# Patient Record
Sex: MALE | Race: WHITE
[De-identification: names, ages, dates, MRNs, and addresses within clinical notes are randomized per-mention and may not be internally consistent; named-entity substitution may affect disease eponyms.]

---

## 2019-12-24 ENCOUNTER — HOSPITAL ENCOUNTER (INPATIENT)
Dept: HOSPITAL 74 - YASAS | Age: 39
LOS: 3 days | Discharge: HOME | DRG: 897 | End: 2019-12-27
Attending: ALLERGY & IMMUNOLOGY | Admitting: ALLERGY & IMMUNOLOGY
Payer: COMMERCIAL

## 2019-12-24 VITALS — BODY MASS INDEX: 42.8 KG/M2

## 2019-12-24 DIAGNOSIS — F10.230: Primary | ICD-10-CM

## 2019-12-24 DIAGNOSIS — F17.210: ICD-10-CM

## 2019-12-24 DIAGNOSIS — K70.30: ICD-10-CM

## 2019-12-24 DIAGNOSIS — E87.6: ICD-10-CM

## 2019-12-24 DIAGNOSIS — Z91.14: ICD-10-CM

## 2019-12-24 DIAGNOSIS — F14.20: ICD-10-CM

## 2019-12-24 PROCEDURE — HZ2ZZZZ DETOXIFICATION SERVICES FOR SUBSTANCE ABUSE TREATMENT: ICD-10-PCS | Performed by: ALLERGY & IMMUNOLOGY

## 2019-12-24 RX ADMIN — FAMOTIDINE SCH MG: 20 TABLET ORAL at 21:52

## 2019-12-24 RX ADMIN — Medication SCH: at 23:29

## 2019-12-24 NOTE — HP
CIWA Score


Nausea/Vomitin


Muscle Tremors: 3


Anxiety: 3


Agitation: 3


Paroxysmal Sweats: 1-Minimal Palms Moist


Orientation: 0-Oriented


Tacttile Disturbances: 1-Very Mild Itch/Numbness


Auditory Disturbances: 0-None


Visual Disturbances: 0-None


Headache: 2-Mild


CIWA-Ar Total Score: 15





- Admission Criteria


[facility] Guidelines: Admission for Medically Managed Detox: 


Requires at least one of the followin. CIWA greater than 12


2. Seizures within the past 24 hours


3. Delirium tremens within the past 24 hours


4. Hallucinations within the past 24 hours


5. Acute intervention needed for co  occurring medical disorder


6. Acute intervention needed for co  occurring psychiatric disorder


7. Severe withdrawal that cannot be handled at a lower level of care (continued


    vomiting, continued diarrhea, abnormal vital signs) requiring intravenous


    medication and/or fluids


8. Pregnancy








Admitting History and Physical





- Admission


Chief Complaint: i need help to stop drinking alcohol,crack


History of Present Illness: 





this 39 years old mlae with alcohol and crack dependence,seeking help to stop,

denied seizure,


syncope


multiple admissions ,last detox 1 week ago


hepatitis c no treatment


history of cirrhosis in 


seen at [facility] by ambulance 19


longest sobriety 2 years


bipolar 1 disorder








History Source: Patient


Limitations to Obtaining History: No Limitations





- Past Medical History


Hepatobiliary: Yes: Cirrhosis


Psych: Yes: Bipolar


Endocrine: Yes: Hyperthyroidism





- Past Surgical History


Additional Past Surgical History: 





carotid stenosis


undescend testes








- Smoking History


Smoking history: Former smoker


Have you smoked in the past 12 months: Yes


Aproximately how many cigarettes per day: 10





- Alcohol/Substance Use


Hx Alcohol Use: Yes ([facility])





- Social History


Usual Living Arrangement: Yes: Other (room mate)


ADL: Support Services (disable)


Occupation: unemploye


History of Recent Travel: No


Other Social History: this 39 years old male with alcohol and cocaine dependence

,seeking detox,withdrawal symptom,seen by [facility] last night,.  disable





Admission ROS [facility]





- HPI


Chief Complaint: 





i need help to stop alcohol and cocaine


Allergies/Adverse Reactions: 


 Allergies











Allergy/AdvReac Type Severity Reaction Status Date / Time


 


amoxicillin trihydrate Allergy Severe Swelling Verified 19 15:08





[From Augmentin]     


 


potassium clavulanate Allergy Severe Hives Verified 19 15:08





[From Augmentin]     


 


Sulfa (Sulfonamide Allergy Severe Swelling Verified 19 15:08





Antibiotics)     











History of Present Illness: 





this 39 years old male with alcohol and cocaine dependence,seeking detox,

withdrawal symptom,seen in [facility] today,recieving ativan


denied seizure


denied syncope


nicotine dependence


bipolar 1 disorder


cirrhosis of liver since 








Exam Limitations: No Limitations





- Ebola screening


Have you traveled outside of the country in the last 21 days: No


Have you had contact with anyone from an Ebola affected area: No


Do you have a fever: No





- Review of Systems


Constitutional: Loss of Appetite, Malaise, Night Sweats, Changes in sleep, 

Weakness


EENT: reports: Nose Congestion


Respiratory: reports: No Symptoms reported


Cardiac: reports: No Symptoms Reported


GI: reports: Nausea, Poor Appetite, Vomiting, Abdominal cramping


: reports: No Symptoms Reported


Musculoskeletal: reports: Back Pain, Muscle Pain


Integumentary: reports: Dryness


Neuro: reports: Headache, Tremors


Endocrine: reports: No Symptoms Reported


Hematology: reports: No Symptoms Reported


Psychiatric: reports: No Sypmtoms Reported, Judgement Intact, Mood/Affect 

Appropiate, Orientated x3


Other Systems: Reviewed and Negative





Patient History





- Patient Medical History


Hx Anemia: No


Hx Asthma: No


Hx Chronic Obstructive Pulmonary Disease (COPD): No


Hx Cancer: No


Hx Cardiac Disorders: No


Hx Congestive Heart Failure: No


Hx Hypertension: Yes (on med,non compliance)


Hx Hypercholesterolemia: No


Hx Pacemaker: No


HX Cerebrovascular Accident: No


Hx Seizures: Yes


Hx Dementia: No


Hx Diabetes: No


Hx Gastrointestinal Disorders: No


Hx Liver Disease: Yes (STAGE 4 CIRROHSIS)


Hx Genitourinary Disorders: No


Hx Sexually Transmitted Disorders: No


Hx Renal Disease (ESRD): No


Hx Thyroid Disease: No


Hx Human Immunodeficiency Virus (HIV): No (last 2019 negative)


Hx Hepatitis C: Yes


Hx Depression: Yes


Hx Suicide Attempt: No


Hx Bipolar Disorder: Yes (borderline personality disorder)


Hx Schizophrenia: No


Other Medical History: no suicidal,no homicidal





- Patient Surgical History


Past Surgical History: Yes


Hx Neurologic Surgery: No


Hx Cataract Extraction: No


Hx Cardiac Surgery: No


Hx Lung Surgery: No


Hx Breast Surgery: No


Hx Breast Biopsy: No


Hx Abdominal Surgery: No


Hx Appendectomy: No


Hx Cholecystectomy: No


Hx Genitourinary Surgery: No


Hx  Section: No


Hx Orthopedic Surgery: No


Hx Hysterectomy: No


Other Surgical History: as infant craniosytosis/ undescended testicle/ deviated 

septum


Anesthesia Reaction: No





- PPD History


Previous Implant?: Yes


Implanted On Prior [facility] Admission?: Yes


Date: 11/12/15


Results: 0 mm


PPD to be Administered?: Yes





- Smoking Cessation


Smoking history: Former smoker


Have you smoked in the past 12 months: Yes


Aproximately how many cigarettes per day: 10


Hx Chewing Tobacco Use: No


Initiated information on smoking cessation: Yes


'Breaking Loose' booklet given: 19





- Substance & Tx. History


Hx Alcohol Use: Yes


Hx Substance Use: Yes


Substance Use Type: Alcohol, Cocaine


Hx Substance Use Treatment: Yes (1 week ago,unknown facility)





- Substances abused


  ** Alcohol


Substance route: Oral


Frequency: Daily


Amount used: >LITER OF BOURBON/VODKA AND 4 24-oz cans of 4locos


Age of first use: 13


Date of last use: 19





  ** Crack


Substance route: Smoking


Frequency: Daily


Amount used: $60


Age of first use: 16


Date of last use: 19





  ** Non-Rx Methadone


Substance route: Oral


Frequency: 1-3 times last 30 days


Amount used: 5mg


Age of first use: 39


Date of last use: 20





  ** K2/Spice


Substance route: Smoking


Frequency: 1-3 times last 30 days


Amount used: 3 puffs


Age of first use: 22


Date of last use: 19





Admission Physical Exam [facility]





- Vital Signs


Vital Signs: 


 Vital Signs - 24 hr











  19





  15:06


 


Temperature 98.2 F


 


Pulse Rate 96 H


 


Respiratory 18





Rate 


 


Blood Pressure 125/76














- Physical


General Appearance: Yes: Moderate Distress, Tremorous, Irritable, Sweating, 

Anxious


HEENTM: Yes: Normal ENT Inspection, ISABELLE, Pharynx Normal


Respiratory: Yes: Lungs Clear, Normal Breath Sounds, No Respiratory Distress


Neck: Yes: Within Normal Limits, Supple, Trachea in good position


Breast: Yes: Within Normal Limits


Cardiology: Yes: Within Normal Limits, Regular Rhythm, Regular Rate, S1, S2


Abdominal: Yes: Normal Bowel Sounds, Non Tender, Flat, Soft


Genitourinary: Yes: Within Normal Limits


Back: Yes: Muscle Spasm


Musculoskeletal: Yes: Back pain, Muscle Pain


Extremities: Yes: Tremors


Neurological: Yes: CNs II-XII NML intact, Alert, Motor Strength 5/5


Integumentary: Yes: Dry


Lymphatic: Yes: Within Normal Limits





- Diagnostic


(1) Alcohol dependence with uncomplicated withdrawal


Current Visit: No   Status: Acute   





(2) Cocaine abuse


Current Visit: No   Status: Acute   





(3) Syncope


Current Visit: No   Status: Acute   





(4) Hepatitis C


Current Visit: No   Status: Chronic   


Qualifiers: 


 





(5) Cocaine dependence


Current Visit: No   Status: Suspected   


Qualifiers: 


   Substance use status: uncomplicated   Qualified Code(s): F14.20 - Cocaine 

dependence, uncomplicated   





(6) Bipolar I disorder


Current Visit: No   Status: Chronic   





(7) Cirrhosis


Current Visit: No   Status: Chronic   


Qualifiers: 


   Hepatic cirrhosis type: alcoholic cirrhosis   Ascites presence: without 

ascites   Qualified Code(s): K70.30 - Alcoholic cirrhosis of liver without 

ascites   





Cleared for Admission S





- Detox or Rehab


[facility] Level of Care: Medically Managed


Detox Regimen/Protocol: Ativan





Breathalyzer





- Breathalyzer


Breathalyzer: 0





Urine Drug Screen





- Test Device


Lot number: [id number]


Expiration date: 21





- Control


Is test valid?: Yes





- Results


Drug screen NEGATIVE: No


Urine drug screen results: ROHIT-Cocaine, BZO-Benzodiazepines





Inpatient Rehab Admission





- Rehab Decision to Admit


Inpatient rehab admission?: No

## 2019-12-25 LAB
ALBUMIN SERPL-MCNC: 3.4 G/DL (ref 3.4–5)
ALP SERPL-CCNC: 104 U/L (ref 45–117)
ALT SERPL-CCNC: 36 U/L (ref 13–61)
ANION GAP SERPL CALC-SCNC: 6 MMOL/L (ref 8–16)
APPEARANCE UR: CLEAR
AST SERPL-CCNC: 42 U/L (ref 15–37)
BILIRUB SERPL-MCNC: 1.1 MG/DL (ref 0.2–1)
BILIRUB UR STRIP.AUTO-MCNC: NEGATIVE MG/DL
BUN SERPL-MCNC: 8.6 MG/DL (ref 7–18)
CALCIUM SERPL-MCNC: 8.4 MG/DL (ref 8.5–10.1)
CHLORIDE SERPL-SCNC: 107 MMOL/L (ref 98–107)
CO2 SERPL-SCNC: 28 MMOL/L (ref 21–32)
COLOR UR: YELLOW
CREAT SERPL-MCNC: 0.7 MG/DL (ref 0.55–1.3)
DEPRECATED RDW RBC AUTO: 16.5 % (ref 11.9–15.9)
GLUCOSE SERPL-MCNC: 94 MG/DL (ref 74–106)
HCT VFR BLD CALC: 38.4 % (ref 35.4–49)
HGB BLD-MCNC: 13.3 GM/DL (ref 11.7–16.9)
KETONES UR QL STRIP: NEGATIVE
LEUKOCYTE ESTERASE UR QL STRIP.AUTO: NEGATIVE
MCH RBC QN AUTO: 30.9 PG (ref 25.7–33.7)
MCHC RBC AUTO-ENTMCNC: 34.7 G/DL (ref 32–35.9)
MCV RBC: 89 FL (ref 80–96)
NITRITE UR QL STRIP: NEGATIVE
PH UR: 6 [PH] (ref 5–8)
PLATELET # BLD AUTO: 66 K/MM3 (ref 134–434)
PMV BLD: 10.3 FL (ref 7.5–11.1)
POTASSIUM SERPLBLD-SCNC: 3.4 MMOL/L (ref 3.5–5.1)
PROT SERPL-MCNC: 6 G/DL (ref 6.4–8.2)
PROT UR QL STRIP: NEGATIVE
PROT UR QL STRIP: NEGATIVE
RBC # BLD AUTO: 4.32 M/MM3 (ref 4–5.6)
SODIUM SERPL-SCNC: 141 MMOL/L (ref 136–145)
SP GR UR: 1.01 (ref 1.01–1.03)
UROBILINOGEN UR STRIP-MCNC: 1 MG/DL (ref 0.2–1)
WBC # BLD AUTO: 3.5 K/MM3 (ref 4–10)

## 2019-12-25 RX ADMIN — Medication SCH MG: at 22:17

## 2019-12-25 RX ADMIN — HYDROXYZINE PAMOATE PRN MG: 25 CAPSULE ORAL at 18:04

## 2019-12-25 RX ADMIN — FAMOTIDINE SCH MG: 20 TABLET ORAL at 10:00

## 2019-12-25 RX ADMIN — Medication SCH TAB: at 10:00

## 2019-12-25 RX ADMIN — Medication PRN MG: at 22:18

## 2019-12-25 NOTE — PN
S CIWA





- CIWA Score


Nausea/Vomitin-Mild Nausea/No Vomiting


Muscle Tremors: 3


Anxiety: 3


Agitation: 1-Slight > Activity


Paroxysmal Sweats: 2


Orientation: 1-Uncertain about Date (date of week)


Tacttile Disturbances: 0-None


Auditory Disturbances: 0-None


Visual Disturbances: 0-None


Headache: 1-Very Mild


CIWA-Ar Total Score: 12





[facility] Progress Note (SOAP)


Subjective: 





39 years old male admitted on 19 for alcohol withdrawal sx management


treating with ativan detox regimen


ate breakfast tolerated food and fluid well ambulating on hallway steady gait 


encourage to attend groups and meetings


Objective: 





19 09:40


 Vital Signs











Temperature  97.3 F L  19 09:20


 


Pulse Rate  80   19 09:20


 


Respiratory Rate  18   19 09:20


 


Blood Pressure  130/82   19 09:20


 


O2 Sat by Pulse Oximetry (%)      








lab pending


Assessment: 





19 09:41


alcohol withdrawal


Plan: 





ativan regimen

## 2019-12-26 RX ADMIN — Medication SCH TAB: at 10:21

## 2019-12-26 RX ADMIN — POTASSIUM CHLORIDE SCH MEQ: 20 SOLUTION ORAL at 22:10

## 2019-12-26 RX ADMIN — Medication PRN MG: at 22:12

## 2019-12-26 RX ADMIN — POTASSIUM CHLORIDE SCH MEQ: 20 SOLUTION ORAL at 12:10

## 2019-12-26 RX ADMIN — Medication SCH MG: at 22:11

## 2019-12-26 RX ADMIN — HYDROXYZINE PAMOATE PRN MG: 25 CAPSULE ORAL at 17:15

## 2019-12-26 RX ADMIN — FAMOTIDINE SCH MG: 20 TABLET ORAL at 10:21

## 2019-12-26 NOTE — PN
[facility] CIWA





- CIWA Score


Nausea/Vomitin-Mild Nausea/No Vomiting


Muscle Tremors: 3


Anxiety: 4-Mod. Anxious/Guarded


Agitation: 2


Paroxysmal Sweats: 1-Minimal Palms Moist


Orientation: 0-Oriented


Tacttile Disturbances: 0-None


Auditory Disturbances: 0-None


Visual Disturbances: 0-None


Headache: 0-None Present


CIWA-Ar Total Score: 11





[facility] Progress Note (SOAP)


Subjective: 





39 years old male admitted on 19 for alcohol withdrawal sx management


treating with ativan detox regimen


c/o loose stool x 1 after breakfast


imodium 4 mg po x 1


discontinue MOM and citric


Objective: 





19 10:31


 Vital Signs











Temperature  96.9 F L  19 09:26


 


Pulse Rate  69   19 09:26


 


Respiratory Rate  18   19 09:26


 


Blood Pressure  108/65   19 09:26


 


O2 Sat by Pulse Oximetry (%)      








 Laboratory Last Values











WBC  3.5 K/mm3 (4.0-10.0)  L  19  07:50    


 


RBC  4.32 M/mm3 (4.00-5.60)   19  07:50    


 


Hgb  13.3 GM/dL (11.7-16.9)   19  07:50    


 


Hct  38.4 % (35.4-49)   19  07:50    


 


MCV  89.0 fl (80-96)   19  07:50    


 


MCH  30.9 pg (25.7-33.7)   19  07:50    


 


MCHC  34.7 g/dl (32.0-35.9)   19  07:50    


 


RDW  16.5 % (11.9-15.9)  H  19  07:50    


 


Plt Count  66 K/MM3 (134-434)  L D 19  07:50    


 


MPV  10.3 fl (7.5-11.1)   19  07:50    


 


Sodium  141 mmol/L (136-145)   19  07:50    


 


Potassium  3.4 mmol/L (3.5-5.1)  L  19  07:50    


 


Chloride  107 mmol/L ()   19  07:50    


 


Carbon Dioxide  28 mmol/L (21-32)   19  07:50    


 


Anion Gap  6 MMOL/L (8-16)  L  19  07:50    


 


BUN  8.6 mg/dL (7-18)   19  07:50    


 


Creatinine  0.7 mg/dL (0.55-1.3)   19  07:50    


 


Est GFR (CKD-EPI)AfAm  138.75   19  07:50    


 


Est GFR (CKD-EPI)NonAf  119.71   19  07:50    


 


Random Glucose  94 mg/dL ()   19  07:50    


 


Calcium  8.4 mg/dL (8.5-10.1)  L  19  07:50    


 


Total Bilirubin  1.1 mg/dL (0.2-1)  H  19  07:50    


 


AST  42 U/L (15-37)  H  19  07:50    


 


ALT  36 U/L (13-61)   19  07:50    


 


Alkaline Phosphatase  104 U/L ()   19  07:50    


 


Total Protein  6.0 g/dl (6.4-8.2)  L  19  07:50    


 


Albumin  3.4 g/dl (3.4-5.0)   19  07:50    


 


Urine Color  Yellow   19  07:50    


 


Urine Appearance  Clear   19  07:50    


 


Urine pH  6.0  (5.0-8.0)  D 19  07:50    


 


Ur Specific Gravity  1.014  (1.010-1.035)   19  07:50    


 


Urine Protein  Negative  (NEGATIVE)   19  07:50    


 


Urine Glucose (UA)  Negative  (NEGATIVE)   19  07:50    


 


Urine Ketones  Negative  (NEGATIVE)   19  07:50    


 


Urine Blood  Negative  (NEGATIVE)   19  07:50    


 


Urine Nitrite  Negative  (NEGATIVE)   19  07:50    


 


Urine Bilirubin  Negative  (NEGATIVE)   19  07:50    


 


Urine Urobilinogen  1.0 mg/dL (0.2-1.0)   19  07:50    


 


Ur Leukocyte Esterase  Negative  (NEGATIVE)   19  07:50    


 


RPR Titer  Nonreactive  (NONREACTIVE)   19  07:50    








lab noted


low plt


discontinue motrin


low K+


potassium 20meq bid x 2 days


19 10:33





Assessment: 





19 10:34


alcohol withdrawal


Plan: 





ativan regimen

## 2019-12-26 NOTE — EKG
Test Reason : 

Blood Pressure : ***/*** mmHG

Vent. Rate : 088 BPM     Atrial Rate : 088 BPM

   P-R Int : 150 ms          QRS Dur : 098 ms

    QT Int : 386 ms       P-R-T Axes : 051 003 042 degrees

   QTc Int : 467 ms

 

NORMAL SINUS RHYTHM

NORMAL ECG

WHEN COMPARED WITH ECG OF 31-AUG-2016 21:53,

NO SIGNIFICANT CHANGE WAS FOUND

Confirmed by CHRIS WREN MD ([id number]) on 12/26/2019 10:31:06 AM

 

Referred By:  MANUEL           Confirmed By:CHRIS WREN MD

## 2019-12-27 VITALS — DIASTOLIC BLOOD PRESSURE: 61 MMHG | TEMPERATURE: 97.1 F | HEART RATE: 69 BPM | SYSTOLIC BLOOD PRESSURE: 104 MMHG

## 2019-12-27 RX ADMIN — HYDROXYZINE PAMOATE PRN MG: 25 CAPSULE ORAL at 01:40

## 2019-12-27 RX ADMIN — POTASSIUM CHLORIDE SCH MEQ: 20 SOLUTION ORAL at 10:16

## 2019-12-27 RX ADMIN — FAMOTIDINE SCH MG: 20 TABLET ORAL at 10:14

## 2019-12-27 RX ADMIN — Medication SCH TAB: at 10:14

## 2019-12-27 NOTE — PN
S CIWA





- CIWA Score


Nausea/Vomitin-No Nausea/No Vomiting


Muscle Tremors: None


Anxiety: 2


Agitation: 0-Normal Activity


Paroxysmal Sweats: 2


Orientation: 0-Oriented


Tacttile Disturbances: 0-None


Auditory Disturbances: 0-None


Visual Disturbances: 0-None


Headache: 2-Mild


CIWA-Ar Total Score: 6





[facility] Progress Note (SOAP)


Subjective: 





c/o mild sweats and anxiety.


Objective: 





19 11:55


 Vital Signs











  19





  06:16 09:09


 


Temperature 97.4 F L 97.8 F


 


Pulse Rate 68 71


 


Respiratory 20 16





Rate  


 


Blood Pressure 112/63 119/73








 Laboratory Last Values











WBC  3.5 K/mm3 (4.0-10.0)  L  19  07:50    


 


RBC  4.32 M/mm3 (4.00-5.60)   19  07:50    


 


Hgb  13.3 GM/dL (11.7-16.9)   19  07:50    


 


Hct  38.4 % (35.4-49)   19  07:50    


 


MCV  89.0 fl (80-96)   19  07:50    


 


MCH  30.9 pg (25.7-33.7)   19  07:50    


 


MCHC  34.7 g/dl (32.0-35.9)   19  07:50    


 


RDW  16.5 % (11.9-15.9)  H  19  07:50    


 


Plt Count  66 K/MM3 (134-434)  L D 19  07:50    


 


MPV  10.3 fl (7.5-11.1)   19  07:50    


 


Sodium  141 mmol/L (136-145)   19  07:50    


 


Potassium  3.4 mmol/L (3.5-5.1)  L  19  07:50    


 


Chloride  107 mmol/L ()   19  07:50    


 


Carbon Dioxide  28 mmol/L (21-32)   19  07:50    


 


Anion Gap  6 MMOL/L (8-16)  L  19  07:50    


 


BUN  8.6 mg/dL (7-18)   19  07:50    


 


Creatinine  0.7 mg/dL (0.55-1.3)   19  07:50    


 


Est GFR (CKD-EPI)AfAm  138.75   19  07:50    


 


Est GFR (CKD-EPI)NonAf  119.71   19  07:50    


 


Random Glucose  94 mg/dL ()   19  07:50    


 


Calcium  8.4 mg/dL (8.5-10.1)  L  19  07:50    


 


Total Bilirubin  1.1 mg/dL (0.2-1)  H  19  07:50    


 


AST  42 U/L (15-37)  H  19  07:50    


 


ALT  36 U/L (13-61)   19  07:50    


 


Alkaline Phosphatase  104 U/L ()   19  07:50    


 


Total Protein  6.0 g/dl (6.4-8.2)  L  19  07:50    


 


Albumin  3.4 g/dl (3.4-5.0)   19  07:50    


 


Urine Color  Yellow   19  07:50    


 


Urine Appearance  Clear   19  07:50    


 


Urine pH  6.0  (5.0-8.0)  D 19  07:50    


 


Ur Specific Gravity  1.014  (1.010-1.035)   19  07:50    


 


Urine Protein  Negative  (NEGATIVE)   19  07:50    


 


Urine Glucose (UA)  Negative  (NEGATIVE)   19  07:50    


 


Urine Ketones  Negative  (NEGATIVE)   19  07:50    


 


Urine Blood  Negative  (NEGATIVE)   19  07:50    


 


Urine Nitrite  Negative  (NEGATIVE)   19  07:50    


 


Urine Bilirubin  Negative  (NEGATIVE)   19  07:50    


 


Urine Urobilinogen  1.0 mg/dL (0.2-1.0)   19  07:50    


 


Ur Leukocyte Esterase  Negative  (NEGATIVE)   19  07:50    


 


RPR Titer  Nonreactive  (NONREACTIVE)   19  07:50    








Labs noted.


Assessment: 





19 11:56


AOX3, in no acute respiratory distress.


Full ROM, ambulating in the unit.


Withdrawal symptoms.


For d/c tomorrow.


Plan: 





continue detox.


D/C in AM.

## 2019-12-27 NOTE — DS
[facility] Detox Discharge Summary


Admission Date: 


12/24/19





Discharge Date: 12/27/19





- History


Present History: Alcohol Dependence, Cocaine Dependence


Additional Comments: 





Admitted seeking alcohol detox.





- Physical Exam Results


Vital Signs: 


 Vital Signs











Temperature  97.1 F L  12/27/19 17:08


 


Pulse Rate  69   12/27/19 17:08


 


Respiratory Rate  18   12/27/19 17:08


 


Blood Pressure  104/61   12/27/19 17:08


 


O2 Sat by Pulse Oximetry (%)      











Pertinent Admission Physical Exam Findings: 





Admitted w/ alcohol withdrawal symptoms.


 Laboratory Last Values











WBC  3.5 K/mm3 (4.0-10.0)  L  12/25/19  07:50    


 


RBC  4.32 M/mm3 (4.00-5.60)   12/25/19  07:50    


 


Hgb  13.3 GM/dL (11.7-16.9)   12/25/19  07:50    


 


Hct  38.4 % (35.4-49)   12/25/19  07:50    


 


MCV  89.0 fl (80-96)   12/25/19  07:50    


 


MCH  30.9 pg (25.7-33.7)   12/25/19  07:50    


 


MCHC  34.7 g/dl (32.0-35.9)   12/25/19  07:50    


 


RDW  16.5 % (11.9-15.9)  H  12/25/19  07:50    


 


Plt Count  66 K/MM3 (134-434)  L D 12/25/19  07:50    


 


MPV  10.3 fl (7.5-11.1)   12/25/19  07:50    


 


Sodium  141 mmol/L (136-145)   12/25/19  07:50    


 


Potassium  3.4 mmol/L (3.5-5.1)  L  12/25/19  07:50    


 


Chloride  107 mmol/L ()   12/25/19  07:50    


 


Carbon Dioxide  28 mmol/L (21-32)   12/25/19  07:50    


 


Anion Gap  6 MMOL/L (8-16)  L  12/25/19  07:50    


 


BUN  8.6 mg/dL (7-18)   12/25/19  07:50    


 


Creatinine  0.7 mg/dL (0.55-1.3)   12/25/19  07:50    


 


Est GFR (CKD-EPI)AfAm  138.75   12/25/19  07:50    


 


Est GFR (CKD-EPI)NonAf  119.71   12/25/19  07:50    


 


Random Glucose  94 mg/dL ()   12/25/19  07:50    


 


Calcium  8.4 mg/dL (8.5-10.1)  L  12/25/19  07:50    


 


Total Bilirubin  1.1 mg/dL (0.2-1)  H  12/25/19  07:50    


 


AST  42 U/L (15-37)  H  12/25/19  07:50    


 


ALT  36 U/L (13-61)   12/25/19  07:50    


 


Alkaline Phosphatase  104 U/L ()   12/25/19  07:50    


 


Total Protein  6.0 g/dl (6.4-8.2)  L  12/25/19  07:50    


 


Albumin  3.4 g/dl (3.4-5.0)   12/25/19  07:50    


 


Urine Color  Yellow   12/25/19  07:50    


 


Urine Appearance  Clear   12/25/19  07:50    


 


Urine pH  6.0  (5.0-8.0)  D 12/25/19  07:50    


 


Ur Specific Gravity  1.014  (1.010-1.035)   12/25/19  07:50    


 


Urine Protein  Negative  (NEGATIVE)   12/25/19  07:50    


 


Urine Glucose (UA)  Negative  (NEGATIVE)   12/25/19  07:50    


 


Urine Ketones  Negative  (NEGATIVE)   12/25/19  07:50    


 


Urine Blood  Negative  (NEGATIVE)   12/25/19  07:50    


 


Urine Nitrite  Negative  (NEGATIVE)   12/25/19  07:50    


 


Urine Bilirubin  Negative  (NEGATIVE)   12/25/19  07:50    


 


Urine Urobilinogen  1.0 mg/dL (0.2-1.0)   12/25/19  07:50    


 


Ur Leukocyte Esterase  Negative  (NEGATIVE)   12/25/19  07:50    


 


RPR Titer  Nonreactive  (NONREACTIVE)   12/25/19  07:50    








Labs reviewed.


Patient encouraged to eat a banana a day and drink orange juice. Patient 

encouraged to f/u w/ PCP and show lab reports from this admission.


Patient stated no discharge meds needed. 


Encouraged to f/u w/ a MH Provider or go to ER or call 911 if having suicide or 

violent ideation or feeling very ill. 








- Treatment


Hospital Course: Detox Protocol Followed, Discharged Condition Good (Alert and 

oriented. Denies thoughts of harming self or others. Discussed real potential 

for relapse and stated "I'm fine".)





- Medication


Discharge Medications: 


Ambulatory Orders





Mirtazapine 30 mg PO HS 08/28/16 


Omeprazole 40 mg PO DAILY 08/28/16 


Propranolol HCl 20 mg PO BID 08/28/16 


Bupropion HCl [Wellbutrin Xl] 300 mg PO DAILY 12/24/19 











- Diagnosis


(1) History of syncope


Status: Suspected   





(2) Alcohol dependence with uncomplicated withdrawal


Status: Acute   





(3) Nicotine dependence


Status: Chronic   


Qualifiers: 


   Nicotine product type: cigarettes   Substance use status: uncomplicated   

Qualified Code(s): F17.210 - Nicotine dependence, cigarettes, uncomplicated   





(4) Cocaine dependence


Status: Chronic   


Qualifiers: 


   Substance use status: uncomplicated   Qualified Code(s): F14.20 - Cocaine 

dependence, uncomplicated   





- AMA


Did Patient Leave Against Medical Advice: No

## 2021-03-06 ENCOUNTER — HOSPITAL ENCOUNTER (INPATIENT)
Dept: HOSPITAL 74 - YASAS | Age: 41
LOS: 2 days | Discharge: LEFT BEFORE DISCHARGE | DRG: 894 | End: 2021-03-08
Attending: ALLERGY & IMMUNOLOGY | Admitting: ALLERGY & IMMUNOLOGY
Payer: COMMERCIAL

## 2021-03-06 VITALS — BODY MASS INDEX: 39.3 KG/M2

## 2021-03-06 DIAGNOSIS — Z88.8: ICD-10-CM

## 2021-03-06 DIAGNOSIS — F14.21: ICD-10-CM

## 2021-03-06 DIAGNOSIS — D69.6: ICD-10-CM

## 2021-03-06 DIAGNOSIS — F19.282: ICD-10-CM

## 2021-03-06 DIAGNOSIS — Z98.890: ICD-10-CM

## 2021-03-06 DIAGNOSIS — F60.3: ICD-10-CM

## 2021-03-06 DIAGNOSIS — Z62.810: ICD-10-CM

## 2021-03-06 DIAGNOSIS — E80.6: ICD-10-CM

## 2021-03-06 DIAGNOSIS — E72.20: ICD-10-CM

## 2021-03-06 DIAGNOSIS — F11.21: ICD-10-CM

## 2021-03-06 DIAGNOSIS — F19.24: ICD-10-CM

## 2021-03-06 DIAGNOSIS — E66.9: ICD-10-CM

## 2021-03-06 DIAGNOSIS — F17.210: ICD-10-CM

## 2021-03-06 DIAGNOSIS — F12.20: ICD-10-CM

## 2021-03-06 DIAGNOSIS — E87.6: ICD-10-CM

## 2021-03-06 DIAGNOSIS — F90.9: ICD-10-CM

## 2021-03-06 DIAGNOSIS — G47.30: ICD-10-CM

## 2021-03-06 DIAGNOSIS — R74.01: ICD-10-CM

## 2021-03-06 DIAGNOSIS — F31.9: ICD-10-CM

## 2021-03-06 DIAGNOSIS — Z91.410: ICD-10-CM

## 2021-03-06 DIAGNOSIS — Z88.1: ICD-10-CM

## 2021-03-06 DIAGNOSIS — F10.230: Primary | ICD-10-CM

## 2021-03-06 DIAGNOSIS — K70.30: ICD-10-CM

## 2021-03-06 PROCEDURE — HZ2ZZZZ DETOXIFICATION SERVICES FOR SUBSTANCE ABUSE TREATMENT: ICD-10-PCS | Performed by: ALLERGY & IMMUNOLOGY

## 2021-03-06 PROCEDURE — U0003 INFECTIOUS AGENT DETECTION BY NUCLEIC ACID (DNA OR RNA); SEVERE ACUTE RESPIRATORY SYNDROME CORONAVIRUS 2 (SARS-COV-2) (CORONAVIRUS DISEASE [COVID-19]), AMPLIFIED PROBE TECHNIQUE, MAKING USE OF HIGH THROUGHPUT TECHNOLOGIES AS DESCRIBED BY CMS-2020-01-R: HCPCS

## 2021-03-06 PROCEDURE — C9803 HOPD COVID-19 SPEC COLLECT: HCPCS

## 2021-03-06 RX ADMIN — Medication SCH MG: at 22:10

## 2021-03-06 RX ADMIN — METHOCARBAMOL PRN MG: 500 TABLET ORAL at 18:15

## 2021-03-07 ENCOUNTER — HOSPITAL ENCOUNTER (EMERGENCY)
Dept: HOSPITAL 74 - JER | Age: 41
LOS: 1 days | Discharge: HOME | End: 2021-03-08
Payer: COMMERCIAL

## 2021-03-07 VITALS — HEART RATE: 88 BPM | DIASTOLIC BLOOD PRESSURE: 91 MMHG | SYSTOLIC BLOOD PRESSURE: 121 MMHG | TEMPERATURE: 98.8 F

## 2021-03-07 VITALS — BODY MASS INDEX: 41.4 KG/M2

## 2021-03-07 DIAGNOSIS — E87.6: ICD-10-CM

## 2021-03-07 DIAGNOSIS — D69.6: Primary | ICD-10-CM

## 2021-03-07 DIAGNOSIS — F10.20: ICD-10-CM

## 2021-03-07 LAB
ALBUMIN SERPL-MCNC: 3.7 G/DL (ref 3.4–5)
ALBUMIN SERPL-MCNC: 3.9 G/DL (ref 3.4–5)
ALP SERPL-CCNC: 108 U/L (ref 45–117)
ALP SERPL-CCNC: 98 U/L (ref 45–117)
ALT SERPL-CCNC: 40 U/L (ref 13–61)
ALT SERPL-CCNC: 43 U/L (ref 13–61)
ANION GAP SERPL CALC-SCNC: 6 MMOL/L (ref 8–16)
ANION GAP SERPL CALC-SCNC: 6 MMOL/L (ref 8–16)
APTT BLD: 30.3 SECONDS (ref 25.2–36.5)
AST SERPL-CCNC: 74 U/L (ref 15–37)
AST SERPL-CCNC: 79 U/L (ref 15–37)
BILIRUB SERPL-MCNC: 4.2 MG/DL (ref 0.2–1)
BILIRUB SERPL-MCNC: 4.4 MG/DL (ref 0.2–1)
BUN SERPL-MCNC: 12.8 MG/DL (ref 7–18)
BUN SERPL-MCNC: 9.1 MG/DL (ref 7–18)
CALCIUM SERPL-MCNC: 8.9 MG/DL (ref 8.5–10.1)
CALCIUM SERPL-MCNC: 9.8 MG/DL (ref 8.5–10.1)
CHLORIDE SERPL-SCNC: 103 MMOL/L (ref 98–107)
CHLORIDE SERPL-SCNC: 98 MMOL/L (ref 98–107)
CO2 SERPL-SCNC: 30 MMOL/L (ref 21–32)
CO2 SERPL-SCNC: 33 MMOL/L (ref 21–32)
CREAT SERPL-MCNC: 0.7 MG/DL (ref 0.55–1.3)
CREAT SERPL-MCNC: 0.9 MG/DL (ref 0.55–1.3)
DEPRECATED RDW RBC AUTO: 17 % (ref 11.9–15.9)
GLUCOSE SERPL-MCNC: 82 MG/DL (ref 74–106)
GLUCOSE SERPL-MCNC: 90 MG/DL (ref 74–106)
HCT VFR BLD CALC: 44.5 % (ref 35.4–49)
HGB BLD-MCNC: 16 GM/DL (ref 11.7–16.9)
INR BLD: 1.4 (ref 0.83–1.09)
INR BLD: 1.44 (ref 0.83–1.09)
MAGNESIUM SERPL-MCNC: 2 MG/DL (ref 1.8–2.4)
MCH RBC QN AUTO: 32.5 PG (ref 25.7–33.7)
MCHC RBC AUTO-ENTMCNC: 36 G/DL (ref 32–35.9)
MCV RBC: 90.1 FL (ref 80–96)
PHOSPHATE SERPL-MCNC: 2.5 MG/DL (ref 2.5–4.9)
PLATELET # BLD AUTO: 37 K/MM3 (ref 134–434)
PMV BLD: 9.2 FL (ref 7.5–11.1)
POTASSIUM SERPLBLD-SCNC: 2.6 MMOL/L (ref 3.5–5.1)
POTASSIUM SERPLBLD-SCNC: 3.4 MMOL/L (ref 3.5–5.1)
PROT SERPL-MCNC: 6.4 G/DL (ref 6.4–8.2)
PROT SERPL-MCNC: 6.9 G/DL (ref 6.4–8.2)
PT PNL PPP: 17 SEC (ref 9.7–13)
PT PNL PPP: 17.5 SEC (ref 9.7–13)
RBC # BLD AUTO: 4.94 M/MM3 (ref 4–5.6)
SODIUM SERPL-SCNC: 137 MMOL/L (ref 136–145)
SODIUM SERPL-SCNC: 139 MMOL/L (ref 136–145)
WBC # BLD AUTO: 3.4 K/MM3 (ref 4–10)

## 2021-03-07 RX ADMIN — Medication SCH TAB: at 10:15

## 2021-03-07 RX ADMIN — TAMSULOSIN HYDROCHLORIDE SCH MG: 0.4 CAPSULE ORAL at 10:15

## 2021-03-07 RX ADMIN — LEVOTHYROXINE SODIUM SCH MCG: 25 TABLET ORAL at 06:06

## 2021-03-07 RX ADMIN — LACTULOSE SCH GM: 20 SOLUTION ORAL at 17:32

## 2021-03-07 RX ADMIN — PANTOPRAZOLE SODIUM SCH MG: 40 TABLET, DELAYED RELEASE ORAL at 10:15

## 2021-03-07 RX ADMIN — METHOCARBAMOL PRN MG: 500 TABLET ORAL at 17:30

## 2021-03-08 VITALS — DIASTOLIC BLOOD PRESSURE: 90 MMHG | SYSTOLIC BLOOD PRESSURE: 141 MMHG | TEMPERATURE: 96.8 F | HEART RATE: 89 BPM

## 2021-03-08 LAB
ALBUMIN SERPL-MCNC: 3.4 G/DL (ref 3.4–5)
ALP SERPL-CCNC: 112 U/L (ref 45–117)
ALT SERPL-CCNC: 37 U/L (ref 13–61)
ANION GAP SERPL CALC-SCNC: 7 MMOL/L (ref 8–16)
AST SERPL-CCNC: 56 U/L (ref 15–37)
BILIRUB SERPL-MCNC: 2.5 MG/DL (ref 0.2–1)
BUN SERPL-MCNC: 14.8 MG/DL (ref 7–18)
CALCIUM SERPL-MCNC: 9.1 MG/DL (ref 8.5–10.1)
CHLORIDE SERPL-SCNC: 106 MMOL/L (ref 98–107)
CO2 SERPL-SCNC: 28 MMOL/L (ref 21–32)
CREAT SERPL-MCNC: 0.9 MG/DL (ref 0.55–1.3)
GLUCOSE SERPL-MCNC: 102 MG/DL (ref 74–106)
POTASSIUM SERPLBLD-SCNC: 3.2 MMOL/L (ref 3.5–5.1)
PROT SERPL-MCNC: 6.1 G/DL (ref 6.4–8.2)
SODIUM SERPL-SCNC: 142 MMOL/L (ref 136–145)

## 2021-03-08 RX ADMIN — LACTULOSE SCH GM: 20 SOLUTION ORAL at 15:05

## 2021-03-08 RX ADMIN — PANTOPRAZOLE SODIUM SCH MG: 40 TABLET, DELAYED RELEASE ORAL at 10:20

## 2021-03-08 RX ADMIN — LACTULOSE SCH GM: 20 SOLUTION ORAL at 01:30

## 2021-03-08 RX ADMIN — TAMSULOSIN HYDROCHLORIDE SCH MG: 0.4 CAPSULE ORAL at 07:47

## 2021-03-08 RX ADMIN — LEVOTHYROXINE SODIUM SCH MCG: 25 TABLET ORAL at 07:47

## 2021-03-08 RX ADMIN — Medication SCH: at 01:32

## 2021-03-08 RX ADMIN — METHOCARBAMOL PRN MG: 500 TABLET ORAL at 05:17

## 2021-03-08 RX ADMIN — Medication SCH TAB: at 10:20

## 2021-03-08 RX ADMIN — LACTULOSE SCH GM: 20 SOLUTION ORAL at 10:24

## 2022-10-30 ENCOUNTER — EMERGENCY (EMERGENCY)
Facility: HOSPITAL | Age: 42
LOS: 1 days | Discharge: HOME | End: 2022-10-30
Attending: EMERGENCY MEDICINE
Payer: MEDICARE

## 2022-10-30 VITALS
HEART RATE: 96 BPM | RESPIRATION RATE: 18 BRPM | SYSTOLIC BLOOD PRESSURE: 126 MMHG | OXYGEN SATURATION: 96 % | DIASTOLIC BLOOD PRESSURE: 88 MMHG | TEMPERATURE: 98 F

## 2022-10-30 LAB
ALBUMIN SERPL ELPH-MCNC: 5 G/DL — SIGNIFICANT CHANGE UP (ref 3.3–5.2)
ALP SERPL-CCNC: 98 U/L — SIGNIFICANT CHANGE UP (ref 40–120)
ALT FLD-CCNC: 23 U/L — SIGNIFICANT CHANGE UP
ANION GAP SERPL CALC-SCNC: 15 MMOL/L — SIGNIFICANT CHANGE UP (ref 5–17)
APTT BLD: 32.4 SEC — SIGNIFICANT CHANGE UP (ref 27.5–35.5)
AST SERPL-CCNC: 34 U/L — SIGNIFICANT CHANGE UP
BASOPHILS # BLD AUTO: 0.05 K/UL — SIGNIFICANT CHANGE UP (ref 0–0.2)
BASOPHILS NFR BLD AUTO: 0.4 % — SIGNIFICANT CHANGE UP (ref 0–2)
BILIRUB SERPL-MCNC: 3.2 MG/DL — HIGH (ref 0.4–2)
BLD GP AB SCN SERPL QL: SIGNIFICANT CHANGE UP
BUN SERPL-MCNC: 16.5 MG/DL — SIGNIFICANT CHANGE UP (ref 8–20)
CALCIUM SERPL-MCNC: 9.6 MG/DL — SIGNIFICANT CHANGE UP (ref 8.4–10.5)
CHLORIDE SERPL-SCNC: 100 MMOL/L — SIGNIFICANT CHANGE UP (ref 96–108)
CO2 SERPL-SCNC: 23 MMOL/L — SIGNIFICANT CHANGE UP (ref 22–29)
CREAT SERPL-MCNC: 1.01 MG/DL — SIGNIFICANT CHANGE UP (ref 0.5–1.3)
EGFR: 96 ML/MIN/1.73M2 — SIGNIFICANT CHANGE UP
EOSINOPHIL # BLD AUTO: 0.09 K/UL — SIGNIFICANT CHANGE UP (ref 0–0.5)
EOSINOPHIL NFR BLD AUTO: 0.7 % — SIGNIFICANT CHANGE UP (ref 0–6)
GLUCOSE SERPL-MCNC: 104 MG/DL — HIGH (ref 70–99)
HCT VFR BLD CALC: 49.5 % — SIGNIFICANT CHANGE UP (ref 39–50)
HGB BLD-MCNC: 18.3 G/DL — HIGH (ref 13–17)
IMM GRANULOCYTES NFR BLD AUTO: 0.6 % — SIGNIFICANT CHANGE UP (ref 0–0.9)
INR BLD: 1.41 RATIO — HIGH (ref 0.88–1.16)
LACTATE BLDV-MCNC: 1.6 MMOL/L — SIGNIFICANT CHANGE UP (ref 0.5–2)
LYMPHOCYTES # BLD AUTO: 0.92 K/UL — LOW (ref 1–3.3)
LYMPHOCYTES # BLD AUTO: 7.4 % — LOW (ref 13–44)
MCHC RBC-ENTMCNC: 32 PG — SIGNIFICANT CHANGE UP (ref 27–34)
MCHC RBC-ENTMCNC: 37 GM/DL — HIGH (ref 32–36)
MCV RBC AUTO: 86.5 FL — SIGNIFICANT CHANGE UP (ref 80–100)
MONOCYTES # BLD AUTO: 0.78 K/UL — SIGNIFICANT CHANGE UP (ref 0–0.9)
MONOCYTES NFR BLD AUTO: 6.3 % — SIGNIFICANT CHANGE UP (ref 2–14)
NEUTROPHILS # BLD AUTO: 10.56 K/UL — HIGH (ref 1.8–7.4)
NEUTROPHILS NFR BLD AUTO: 84.6 % — HIGH (ref 43–77)
PLATELET # BLD AUTO: 130 K/UL — LOW (ref 150–400)
POTASSIUM SERPL-MCNC: 3.7 MMOL/L — SIGNIFICANT CHANGE UP (ref 3.5–5.3)
POTASSIUM SERPL-SCNC: 3.7 MMOL/L — SIGNIFICANT CHANGE UP (ref 3.5–5.3)
PROT SERPL-MCNC: 7.5 G/DL — SIGNIFICANT CHANGE UP (ref 6.6–8.7)
PROTHROM AB SERPL-ACNC: 16.4 SEC — HIGH (ref 10.5–13.4)
RBC # BLD: 5.72 M/UL — SIGNIFICANT CHANGE UP (ref 4.2–5.8)
RBC # FLD: 13.2 % — SIGNIFICANT CHANGE UP (ref 10.3–14.5)
SODIUM SERPL-SCNC: 138 MMOL/L — SIGNIFICANT CHANGE UP (ref 135–145)
WBC # BLD: 12.47 K/UL — HIGH (ref 3.8–10.5)
WBC # FLD AUTO: 12.47 K/UL — HIGH (ref 3.8–10.5)

## 2022-10-30 PROCEDURE — 73070 X-RAY EXAM OF ELBOW: CPT | Mod: 26,RT

## 2022-10-30 PROCEDURE — 99285 EMERGENCY DEPT VISIT HI MDM: CPT | Mod: FS

## 2022-10-30 PROCEDURE — 71045 X-RAY EXAM CHEST 1 VIEW: CPT | Mod: 26

## 2022-10-30 PROCEDURE — 73030 X-RAY EXAM OF SHOULDER: CPT | Mod: 26,RT

## 2022-10-30 RX ORDER — KETOROLAC TROMETHAMINE 30 MG/ML
15 SYRINGE (ML) INJECTION ONCE
Refills: 0 | Status: DISCONTINUED | OUTPATIENT
Start: 2022-10-30 | End: 2022-10-30

## 2022-10-30 RX ORDER — SODIUM CHLORIDE 9 MG/ML
1000 INJECTION INTRAMUSCULAR; INTRAVENOUS; SUBCUTANEOUS ONCE
Refills: 0 | Status: COMPLETED | OUTPATIENT
Start: 2022-10-30 | End: 2022-10-30

## 2022-10-30 RX ORDER — MORPHINE SULFATE 50 MG/1
4 CAPSULE, EXTENDED RELEASE ORAL ONCE
Refills: 0 | Status: DISCONTINUED | OUTPATIENT
Start: 2022-10-30 | End: 2022-10-30

## 2022-10-30 RX ADMIN — MORPHINE SULFATE 4 MILLIGRAM(S): 50 CAPSULE, EXTENDED RELEASE ORAL at 23:15

## 2022-10-30 RX ADMIN — MORPHINE SULFATE 4 MILLIGRAM(S): 50 CAPSULE, EXTENDED RELEASE ORAL at 20:44

## 2022-10-30 RX ADMIN — SODIUM CHLORIDE 1000 MILLILITER(S): 9 INJECTION INTRAMUSCULAR; INTRAVENOUS; SUBCUTANEOUS at 21:06

## 2022-10-30 NOTE — ED PROVIDER NOTE - ATTENDING CONTRIBUTION TO CARE
42 year old male with PMH of cirrhosis, esophageal varices, HTN, HLD presented to the ED with complaints of right shoulder pain. Patient states that he was making his bed and felt like he pulled a muscle on his right arm. He took Tylenol 650mg PO with no relief. Patient states 10/10 throbbing pain on right shoulder radiating down arm. Patient states can not move arm at this time. Patient is diaphoretic, restless and moaning in pain at this time. Patient denies chest pain and SOB.    PLAN:  order Labs  order x-ray of the right shoulder  order pain medication

## 2022-10-30 NOTE — ED PROVIDER NOTE - CLINICAL SUMMARY MEDICAL DECISION MAKING FREE TEXT BOX
Patient is a 42 year old male with PMH of cirrhosis, esophageal varices, HTN, HLD presented to the ED with complaints of right shoulder pain. Patient states that he was making his bed and felt like he pulled a muscle on his right arm. He took Tylenol 650mg PO with no relief. Patient states 10/10 throbbing pain on right shoulder radiating down arm. Patient states can not move arm at this time. Patient is diaphoretic, restless and moaning in pain at this time. Patient denies chest pain and SOB.    PLAN:  order Labs  order x-ray of the right shoulder  order pain medication

## 2022-10-30 NOTE — ED PROVIDER NOTE - OBJECTIVE STATEMENT
Patient is a 42 year old male with PMH of cirrhosis, esophageal varices, HTN, HLD presented to the ED with complaints of right shoulder pain. Patient states that he was making his bed and felt like he Patient is a 42 year old male with PMH of cirrhosis, esophageal varices, HTN, HLD presented to the ED with complaints of right shoulder pain. Patient states that he was making his bed and felt like he pulled a muscle on his right arm. He took Tylenol 650mg PO with no relief. Patient states 10/10 throbbing pain on right shoulder radiating down arm. Patient states can not move arm at this time. Patient is diaphoretic, restless and moaning in pain at this time. Patient denies chest pain and SOB.

## 2022-10-30 NOTE — ED ADULT NURSE REASSESSMENT NOTE - NS ED NURSE REASSESS COMMENT FT1
assumed care for pt at 2315, charting as noted. respirations even and unlabored. pt shows no s/s of acute distress at this time. safety precautions maintained. pt awaiting xray results, pt updated on plan of care.

## 2022-10-30 NOTE — ED PROVIDER NOTE - PROGRESS NOTE DETAILS
Fito Self PA-C: PT neuro vasc intact, non surgical Fx pain controlled in the ED no other acute findings, Pt to be placed in obs to help arrange DC planing, then dc home with out pt follow up in a wk with [facility] to [facility].

## 2022-10-31 VITALS
RESPIRATION RATE: 16 BRPM | HEART RATE: 78 BPM | SYSTOLIC BLOOD PRESSURE: 132 MMHG | TEMPERATURE: 98 F | OXYGEN SATURATION: 98 % | DIASTOLIC BLOOD PRESSURE: 65 MMHG

## 2022-10-31 PROCEDURE — 85025 COMPLETE CBC W/AUTO DIFF WBC: CPT

## 2022-10-31 PROCEDURE — 96376 TX/PRO/DX INJ SAME DRUG ADON: CPT | Mod: XU

## 2022-10-31 PROCEDURE — 99284 EMERGENCY DEPT VISIT MOD MDM: CPT | Mod: 25

## 2022-10-31 PROCEDURE — 23570 CLTX SCAPULAR FX W/O MNPJ: CPT | Mod: RT

## 2022-10-31 PROCEDURE — 74177 CT ABD & PELVIS W/CONTRAST: CPT | Mod: 26,MA

## 2022-10-31 PROCEDURE — 96361 HYDRATE IV INFUSION ADD-ON: CPT | Mod: XU

## 2022-10-31 PROCEDURE — 83605 ASSAY OF LACTIC ACID: CPT

## 2022-10-31 PROCEDURE — G0378: CPT

## 2022-10-31 PROCEDURE — 73030 X-RAY EXAM OF SHOULDER: CPT

## 2022-10-31 PROCEDURE — 96374 THER/PROPH/DIAG INJ IV PUSH: CPT | Mod: XU

## 2022-10-31 PROCEDURE — 73070 X-RAY EXAM OF ELBOW: CPT

## 2022-10-31 PROCEDURE — 72125 CT NECK SPINE W/O DYE: CPT | Mod: 26,MA

## 2022-10-31 PROCEDURE — 99220: CPT | Mod: FS

## 2022-10-31 PROCEDURE — 96375 TX/PRO/DX INJ NEW DRUG ADDON: CPT | Mod: XU

## 2022-10-31 PROCEDURE — 86850 RBC ANTIBODY SCREEN: CPT

## 2022-10-31 PROCEDURE — 86900 BLOOD TYPING SEROLOGIC ABO: CPT

## 2022-10-31 PROCEDURE — 74177 CT ABD & PELVIS W/CONTRAST: CPT | Mod: MA

## 2022-10-31 PROCEDURE — 85610 PROTHROMBIN TIME: CPT

## 2022-10-31 PROCEDURE — 86901 BLOOD TYPING SEROLOGIC RH(D): CPT

## 2022-10-31 PROCEDURE — 73200 CT UPPER EXTREMITY W/O DYE: CPT | Mod: MA

## 2022-10-31 PROCEDURE — 72125 CT NECK SPINE W/O DYE: CPT | Mod: MA

## 2022-10-31 PROCEDURE — 71260 CT THORAX DX C+: CPT | Mod: MA

## 2022-10-31 PROCEDURE — 73200 CT UPPER EXTREMITY W/O DYE: CPT | Mod: 26,RT,MA

## 2022-10-31 PROCEDURE — 71045 X-RAY EXAM CHEST 1 VIEW: CPT

## 2022-10-31 PROCEDURE — 85730 THROMBOPLASTIN TIME PARTIAL: CPT

## 2022-10-31 PROCEDURE — 70450 CT HEAD/BRAIN W/O DYE: CPT | Mod: 26,MA

## 2022-10-31 PROCEDURE — 36415 COLL VENOUS BLD VENIPUNCTURE: CPT

## 2022-10-31 PROCEDURE — 70450 CT HEAD/BRAIN W/O DYE: CPT | Mod: MA

## 2022-10-31 PROCEDURE — 71260 CT THORAX DX C+: CPT | Mod: 26,MA

## 2022-10-31 PROCEDURE — 80053 COMPREHEN METABOLIC PANEL: CPT

## 2022-10-31 RX ORDER — BUPROPION HYDROCHLORIDE 150 MG/1
150 TABLET, EXTENDED RELEASE ORAL DAILY
Refills: 0 | Status: DISCONTINUED | OUTPATIENT
Start: 2022-10-31 | End: 2022-11-07

## 2022-10-31 RX ORDER — IBUPROFEN 200 MG
600 TABLET ORAL EVERY 6 HOURS
Refills: 0 | Status: DISCONTINUED | OUTPATIENT
Start: 2022-10-31 | End: 2022-11-07

## 2022-10-31 RX ORDER — LEVOTHYROXINE SODIUM 125 MCG
25 TABLET ORAL DAILY
Refills: 0 | Status: DISCONTINUED | OUTPATIENT
Start: 2022-10-31 | End: 2022-11-07

## 2022-10-31 RX ORDER — TAMSULOSIN HYDROCHLORIDE 0.4 MG/1
1 CAPSULE ORAL
Qty: 30 | Refills: 0
Start: 2022-10-31 | End: 2022-11-29

## 2022-10-31 RX ORDER — TAMSULOSIN HYDROCHLORIDE 0.4 MG/1
0.4 CAPSULE ORAL AT BEDTIME
Refills: 0 | Status: DISCONTINUED | OUTPATIENT
Start: 2022-10-31 | End: 2022-11-07

## 2022-10-31 RX ORDER — IBUPROFEN 200 MG
1 TABLET ORAL
Qty: 20 | Refills: 0
Start: 2022-10-31 | End: 2022-11-04

## 2022-10-31 RX ORDER — OXYCODONE AND ACETAMINOPHEN 5; 325 MG/1; MG/1
1 TABLET ORAL EVERY 6 HOURS
Refills: 0 | Status: COMPLETED | OUTPATIENT
Start: 2022-10-31 | End: 2022-11-07

## 2022-10-31 RX ORDER — PANTOPRAZOLE SODIUM 20 MG/1
1 TABLET, DELAYED RELEASE ORAL
Qty: 30 | Refills: 0
Start: 2022-10-31 | End: 2022-11-29

## 2022-10-31 RX ORDER — METHOCARBAMOL 500 MG/1
1 TABLET, FILM COATED ORAL
Qty: 20 | Refills: 0
Start: 2022-10-31 | End: 2022-11-04

## 2022-10-31 RX ORDER — CARBAMAZEPINE 200 MG
1 TABLET ORAL
Qty: 30 | Refills: 0
Start: 2022-10-31 | End: 2022-11-29

## 2022-10-31 RX ORDER — BUPROPION HYDROCHLORIDE 150 MG/1
1 TABLET, EXTENDED RELEASE ORAL
Qty: 30 | Refills: 0
Start: 2022-10-31 | End: 2022-11-29

## 2022-10-31 RX ORDER — PROPRANOLOL HCL 160 MG
1 CAPSULE, EXTENDED RELEASE 24HR ORAL
Qty: 60 | Refills: 0
Start: 2022-10-31 | End: 2022-11-29

## 2022-10-31 RX ORDER — HYDROMORPHONE HYDROCHLORIDE 2 MG/ML
1 INJECTION INTRAMUSCULAR; INTRAVENOUS; SUBCUTANEOUS ONCE
Refills: 0 | Status: DISCONTINUED | OUTPATIENT
Start: 2022-10-31 | End: 2022-10-31

## 2022-10-31 RX ORDER — BUPROPION HYDROCHLORIDE 150 MG/1
300 TABLET, EXTENDED RELEASE ORAL DAILY
Refills: 0 | Status: DISCONTINUED | OUTPATIENT
Start: 2022-10-31 | End: 2022-11-07

## 2022-10-31 RX ORDER — METHOCARBAMOL 500 MG/1
750 TABLET, FILM COATED ORAL
Refills: 0 | Status: DISCONTINUED | OUTPATIENT
Start: 2022-10-31 | End: 2022-11-07

## 2022-10-31 RX ORDER — CARBAMAZEPINE 200 MG
200 TABLET ORAL AT BEDTIME
Refills: 0 | Status: DISCONTINUED | OUTPATIENT
Start: 2022-10-31 | End: 2022-11-07

## 2022-10-31 RX ORDER — PANTOPRAZOLE SODIUM 20 MG/1
40 TABLET, DELAYED RELEASE ORAL
Refills: 0 | Status: DISCONTINUED | OUTPATIENT
Start: 2022-10-31 | End: 2022-11-07

## 2022-10-31 RX ORDER — LEVOTHYROXINE SODIUM 125 MCG
1 TABLET ORAL
Qty: 30 | Refills: 0
Start: 2022-10-31 | End: 2022-11-29

## 2022-10-31 RX ADMIN — BUPROPION HYDROCHLORIDE 300 MILLIGRAM(S): 150 TABLET, EXTENDED RELEASE ORAL at 08:04

## 2022-10-31 RX ADMIN — Medication 600 MILLIGRAM(S): at 08:05

## 2022-10-31 RX ADMIN — SODIUM CHLORIDE 1000 MILLILITER(S): 9 INJECTION INTRAMUSCULAR; INTRAVENOUS; SUBCUTANEOUS at 07:50

## 2022-10-31 RX ADMIN — MORPHINE SULFATE 4 MILLIGRAM(S): 50 CAPSULE, EXTENDED RELEASE ORAL at 00:17

## 2022-10-31 RX ADMIN — HYDROMORPHONE HYDROCHLORIDE 1 MILLIGRAM(S): 2 INJECTION INTRAMUSCULAR; INTRAVENOUS; SUBCUTANEOUS at 02:20

## 2022-10-31 RX ADMIN — MORPHINE SULFATE 4 MILLIGRAM(S): 50 CAPSULE, EXTENDED RELEASE ORAL at 00:47

## 2022-10-31 RX ADMIN — HYDROMORPHONE HYDROCHLORIDE 1 MILLIGRAM(S): 2 INJECTION INTRAMUSCULAR; INTRAVENOUS; SUBCUTANEOUS at 02:50

## 2022-10-31 RX ADMIN — Medication 15 MILLIGRAM(S): at 00:47

## 2022-10-31 RX ADMIN — Medication 15 MILLIGRAM(S): at 00:17

## 2022-10-31 RX ADMIN — METHOCARBAMOL 750 MILLIGRAM(S): 500 TABLET, FILM COATED ORAL at 08:04

## 2022-10-31 RX ADMIN — Medication 25 MICROGRAM(S): at 06:42

## 2022-10-31 RX ADMIN — PANTOPRAZOLE SODIUM 40 MILLIGRAM(S): 20 TABLET, DELAYED RELEASE ORAL at 06:42

## 2022-10-31 RX ADMIN — TAMSULOSIN HYDROCHLORIDE 0.4 MILLIGRAM(S): 0.4 CAPSULE ORAL at 06:42

## 2022-10-31 RX ADMIN — OXYCODONE AND ACETAMINOPHEN 1 TABLET(S): 5; 325 TABLET ORAL at 08:05
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
## 2022-10-31 NOTE — ED CDU PROVIDER DISPOSITION NOTE - PATIENT PORTAL LINK FT
You can access the FollowMyHealth Patient Portal offered by [facility] by registering at the following website: [email or web address]. By joining [employer]’s FollowMyHealth portal, you will also be able to view your health information using other applications (apps) compatible with our system.

## 2022-10-31 NOTE — ED CDU PROVIDER INITIAL DAY NOTE - ATTENDING APP SHARED VISIT CONTRIBUTION OF CARE
PT neuro vasc intact, non surgical Fx pain controlled in the ED no other acute findings, Pt to be placed in obs to help arrange DC planing, then dc home with out pt follow up in a wk with NWB to [facility].
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
## 2022-10-31 NOTE — ED ADULT NURSE REASSESSMENT NOTE - NS ED NURSE REASSESS COMMENT FT1
received report from Jennifer samayoa and assumed care of pt. pt sitting calm in bed. a and o x3. breathing even and unlabored. awaiting sw consult for transport. will continue to monitor.

## 2022-10-31 NOTE — ED CDU PROVIDER DISPOSITION NOTE - CARE PROVIDER_API CALL
Bang Glover)  Orthopedics  [address] [place] [id number]  Phone: [phone]  Fax: [phone]  Follow Up Time: 7-10 Days

## 2022-10-31 NOTE — CHART NOTE - NSCHARTNOTEFT_GEN_A_CORE
SW Note: SW made aware pt is requesting SW for assist home. SW met w/ pt, pt reports he resides in a sober home on [address], SW offered to call over house to alert of pt's return, however pt declined. Pt has meds to  from [facility], requesting medicaid taxi from [facility] lobby. SW requested taxi via  clerical, pt provided w/ taxi form, no other SW services identified at this time

## 2022-10-31 NOTE — ED CDU PROVIDER INITIAL DAY NOTE - MEDICAL DECISION MAKING DETAILS
PT requiring dc planing placed in obs for CM/SW FOr dc planing. PT seen BY OBS PA found to be appropriate CDU PT care transferred to PA.

## 2022-10-31 NOTE — ED CDU PROVIDER DISPOSITION NOTE - NSFOLLOWUPINSTRUCTIONS_ED_ALL_ED_FT
you have a fracture to the right scapular bone   please keep the sling in place   you need to follow with ORTHOPEDICS in 1 week. please call the contact number tomorrow to schedule your appointment   please take pain medication as directed   please return to the ER for new or worsening symptoms like numbness tingling or worsening pain     Fracture    A fracture is a break in one of your bones. This can occur from a variety of injuries, especially traumatic ones. Symptoms include pain, bruising, or swelling. Do not use the injured limb. If a fracture is in one of the bones below your waist, do not put weight on that limb unless instructed to do so by your healthcare provider. Crutches or a cane may have been provided. A splint or cast may have been applied by your health care provider. Make sure to keep it dry and follow up with an orthopedist as instructed.    SEEK IMMEDIATE MEDICAL CARE IF YOU HAVE ANY OF THE FOLLOWING SYMPTOMS: numbness, tingling, increasing pain, or weakness in any part of the injured limb.

## 2022-10-31 NOTE — ED CDU PROVIDER DISPOSITION NOTE - ADDITIONAL NOTES AND INSTRUCTIONS:
patient is not able to use the right arm, no overhead  lifting,, pushing, pulling.   will need to be cleared by orthopedics prior to return to full house duties

## 2022-10-31 NOTE — ED CDU PROVIDER INITIAL DAY NOTE - OBJECTIVE STATEMENT
Patient is a 42 year old male with PMH of cirrhosis, esophageal varices, HTN, HLD presented to the ED with complaints of right shoulder pain. Patient states that he was making his bed and felt like he pulled a muscle on his right arm. He took Tylenol 650mg PO with no relief. Patient states 10/10 throbbing pain on right shoulder radiating down arm. Patient states can not move arm at this time. Patient is diaphoretic, restless and moaning in pain at this time. Patient denies chest pain and SOB.

## 2022-11-04 PROBLEM — K74.60 UNSPECIFIED CIRRHOSIS OF LIVER: Chronic | Status: ACTIVE | Noted: 2022-10-30

## 2022-11-07 DIAGNOSIS — S42.009A FRACTURE OF UNSPECIFIED PART OF UNSPECIFIED CLAVICLE, INITIAL ENCOUNTER FOR CLOSED FRACTURE: ICD-10-CM

## 2022-11-07 PROBLEM — Z00.00 ENCOUNTER FOR PREVENTIVE HEALTH EXAMINATION: Status: ACTIVE | Noted: 2022-11-07

## 2022-11-08 ENCOUNTER — APPOINTMENT (OUTPATIENT)
Dept: ORTHOPEDIC SURGERY | Facility: CLINIC | Age: 42
End: 2022-11-08

## 2022-11-08 VITALS
SYSTOLIC BLOOD PRESSURE: 130 MMHG | BODY MASS INDEX: 39.51 KG/M2 | HEIGHT: 63 IN | HEART RATE: 77 BPM | WEIGHT: 223 LBS | DIASTOLIC BLOOD PRESSURE: 83 MMHG

## 2022-11-08 DIAGNOSIS — S42.109A FRACTURE OF UNSPECIFIED PART OF SCAPULA, UNSPECIFIED SHOULDER, INITIAL ENCOUNTER FOR CLOSED FRACTURE: ICD-10-CM

## 2022-11-08 DIAGNOSIS — S42.101A FRACTURE OF UNSPECIFIED PART OF SCAPULA, RIGHT SHOULDER, INITIAL ENCOUNTER FOR CLOSED FRACTURE: ICD-10-CM

## 2022-11-08 PROCEDURE — 23570 CLTX SCAPULAR FX W/O MNPJ: CPT | Mod: RT

## 2022-11-08 PROCEDURE — 99203 OFFICE O/P NEW LOW 30 MIN: CPT | Mod: 57

## 2022-11-08 PROCEDURE — 73030 X-RAY EXAM OF SHOULDER: CPT | Mod: RT

## 2022-11-08 RX ORDER — DICLOFENAC SODIUM 50 MG/1
50 TABLET, DELAYED RELEASE ORAL
Qty: 60 | Refills: 0 | Status: ACTIVE | COMMUNITY
Start: 2022-11-08 | End: 1900-01-01

## 2022-11-08 NOTE — PHYSICAL EXAM
[de-identified] : GENERAL APPEARANCE: Well nourished and hydrated, pleasant, alert, and oriented x 3. Appears their stated age. \par HEENT: Normocephalic, extraocular eye motion intact. Nasal septum midline. Oral cavity clear. External auditory canal clear. \par RESPIRATORY: Breath sounds clear and audible in all lobes. No wheezing, No accessory muscle use.\par CARDIOVASCULAR: No apparent abnormalities. No lower leg edema. No varicosities. Pedal pulses are palpable.\par NEUROLOGIC: Sensation is normal, no muscle weakness in the upper or lower extremities.\par DERMATOLOGIC: No apparent skin lesions, moist, warm, no rash.\par SPINE: Cervical spine appears normal and moves freely; thoracic spine appears normal and moves freely; lumbosacral spine appears normal and moves freely, normal, nontender.\par MUSCULOSKELETAL: Hands, wrists, and elbows are normal and move freely, shoulders are normal and move freely. \par Psychiatric: Oriented to person, place, and time, insight and judgement were intact and the affect was normal. \par Right upper extremity: There is ecchymosis present over the scapula and upper arm, there is tenderness palpation over the scapula, range of motion 90 degrees of abduction and forward flexion with pain, and PIN median radial ulnar nerves intact, fingers warm well perfused brisk cap refill [de-identified] : 3 views of the right shoulder obtained the office today show an acute comminuted fracture of the right scapula with displacement of the glenoid.  No significant change from prior x-rays from the emergency room.\par \par CT scan of the shoulder from the emergency room reviewed by me.  Shows comminuted fracture of the scapula

## 2022-11-08 NOTE — DISCUSSION/SUMMARY
[Medication Risks Reviewed] : Medication risks reviewed [Surgical risks reviewed] : Surgical risks reviewed [de-identified] : Patient is a 42-year-old male with a right scapular fracture of unknown origin presenting today for initial evaluation.  He states he has no history of trauma no history of attack and is unsure of how this happened.  Despite multiple rounds of questioning he is unable to state how this happened.  I recommend that he be nonweightbearing in his right upper extremity.  I recommended physical therapy for pendulum and range of motion.  I have recommended he follow-up with one of my trauma colleagues for further evaluation management and possible discussion of surgical intervention.  I will see him back in 2 weeks for repeat evaluation if deemed a nonoperative candidate.  All questions were asked and answered
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
## 2022-11-16 ENCOUNTER — EMERGENCY (EMERGENCY)
Facility: HOSPITAL | Age: 42
LOS: 1 days | Discharge: HOME | End: 2022-11-16
Attending: EMERGENCY MEDICINE
Payer: MEDICARE

## 2022-11-16 ENCOUNTER — APPOINTMENT (OUTPATIENT)
Dept: ORTHOPEDIC SURGERY | Facility: CLINIC | Age: 42
End: 2022-11-16

## 2022-11-16 VITALS
HEIGHT: 67 IN | HEART RATE: 95 BPM | OXYGEN SATURATION: 96 % | RESPIRATION RATE: 15 BRPM | DIASTOLIC BLOOD PRESSURE: 78 MMHG | SYSTOLIC BLOOD PRESSURE: 116 MMHG | WEIGHT: 169.98 LBS | TEMPERATURE: 97 F

## 2022-11-16 VITALS
TEMPERATURE: 98 F | HEART RATE: 84 BPM | DIASTOLIC BLOOD PRESSURE: 90 MMHG | RESPIRATION RATE: 15 BRPM | OXYGEN SATURATION: 95 % | SYSTOLIC BLOOD PRESSURE: 151 MMHG

## 2022-11-16 LAB — ETHANOL SERPL-MCNC: 250 MG/DL — HIGH (ref 0–9)

## 2022-11-16 NOTE — ED PROVIDER NOTE - PROGRESS NOTE DETAILS
Loyda RON: Patient reassesed. Vital signs normal.  Resting comfortably.  A&Ox3.  Speech clear. Gait normal.  Clinically sober.

## 2022-11-16 NOTE — ED PROVIDER NOTE - CLINICAL SUMMARY MEDICAL DECISION MAKING FREE TEXT BOX
36y Male endorsing to alcohol ingestion who was found [place] by PD. FS within normal limits, neurovascularly intact, hemodynamically stable. Will continue to reassess.

## 2022-11-16 NOTE — ED PROVIDER NOTE - ATTENDING CONTRIBUTION TO CARE
Erika RON- 35 Y/O M with unknown identity was brought to ED for alcohol intoxication and is admitting to drinking  but does not offer much history.   Pt is lethargic, intoxicated appearing male, s1s2 normal reg, b/l clear breath sounds, abd soft, nt, nd, neuro exam - slow reaction, no focal deficit, wake sup to voice and pain, skin warm, dry, good turgor    pt woke up and went to restroom with assistance but is wobbly, will check alcohol level and reassess

## 2022-11-16 NOTE — ED ADULT TRIAGE NOTE - CHIEF COMPLAINT QUOTE
pt BIBA found outside drinking alcohol. pt offers no medical complaints, changed into gown and belongings secured

## 2022-11-16 NOTE — ED PROVIDER NOTE - OBJECTIVE STATEMENT
36y Male BIBEMS found outside [address] endorsing to alcohol ingestion. Patient is sleepy but arousable to painful stimuli and denies any complaints, moving all extremities without difficulty.

## 2022-11-16 NOTE — ED PROVIDER NOTE - PATIENT PORTAL LINK FT
You can access the FollowMyHealth Patient Portal offered by [facility] by registering at the following website: [email or web address]. By joining [employer]’s FollowMyHealth portal, you will also be able to view your health information using other applications (apps) compatible with our system.

## 2022-11-28 ENCOUNTER — APPOINTMENT (OUTPATIENT)
Dept: ORTHOPEDIC SURGERY | Facility: CLINIC | Age: 42
End: 2022-11-28

## 2023-01-12 ENCOUNTER — HOSPITAL ENCOUNTER (INPATIENT)
Dept: HOSPITAL 74 - JER | Age: 43
LOS: 6 days | Discharge: HOME | DRG: 603 | End: 2023-01-18
Attending: INTERNAL MEDICINE | Admitting: INTERNAL MEDICINE
Payer: COMMERCIAL

## 2023-01-12 VITALS — BODY MASS INDEX: 40.7 KG/M2

## 2023-01-12 DIAGNOSIS — K21.9: ICD-10-CM

## 2023-01-12 DIAGNOSIS — I85.10: ICD-10-CM

## 2023-01-12 DIAGNOSIS — E87.6: ICD-10-CM

## 2023-01-12 DIAGNOSIS — F10.939: ICD-10-CM

## 2023-01-12 DIAGNOSIS — L03.115: ICD-10-CM

## 2023-01-12 DIAGNOSIS — K70.30: ICD-10-CM

## 2023-01-12 DIAGNOSIS — F60.3: ICD-10-CM

## 2023-01-12 DIAGNOSIS — L03.116: Primary | ICD-10-CM

## 2023-01-12 DIAGNOSIS — R45.851: ICD-10-CM

## 2023-01-12 DIAGNOSIS — E66.9: ICD-10-CM

## 2023-01-12 DIAGNOSIS — F31.9: ICD-10-CM

## 2023-01-12 DIAGNOSIS — E03.9: ICD-10-CM

## 2023-01-12 LAB
ALBUMIN SERPL-MCNC: 3.4 G/DL (ref 3.4–5)
ALP SERPL-CCNC: 104 U/L (ref 45–117)
ALT SERPL-CCNC: 57 U/L (ref 13–61)
AMPHET UR-MCNC: NEGATIVE NG/ML
ANION GAP SERPL CALC-SCNC: 15 MMOL/L (ref 8–16)
ANISOCYTOSIS BLD QL: (no result)
APPEARANCE UR: CLEAR
APTT BLD: 25.4 SECONDS (ref 25.2–36.5)
AST SERPL-CCNC: 93 U/L (ref 15–37)
BARBITURATES UR-MCNC: NEGATIVE UG/ML
BASE EXCESS BLDV CALC-SCNC: 7.4 MMOL/L (ref -2–2)
BENZODIAZ UR SCN-MCNC: POSITIVE NG/ML
BILIRUB SERPL-MCNC: 0.9 MG/DL (ref 0.2–1)
BILIRUB UR STRIP.AUTO-MCNC: NEGATIVE MG/DL
BUN SERPL-MCNC: 3.1 MG/DL (ref 7–18)
CALCIUM SERPL-MCNC: 7.8 MG/DL (ref 8.5–10.1)
CHLORIDE SERPL-SCNC: 98 MMOL/L (ref 98–107)
CO2 SERPL-SCNC: 30 MMOL/L (ref 21–32)
COCAINE UR-MCNC: POSITIVE NG/ML
COLOR UR: YELLOW
CREAT SERPL-MCNC: 0.6 MG/DL (ref 0.55–1.3)
DEPRECATED RDW RBC AUTO: 15.5 % (ref 11.9–15.9)
GLUCOSE SERPL-MCNC: 97 MG/DL (ref 74–106)
HCT VFR BLD CALC: 37.5 % (ref 35.4–49)
HCT VFR BLDV CALC: 40 % (ref 35.4–49)
HGB BLD-MCNC: 13 GM/DL (ref 11.7–16.9)
HIV 1+2 AB+HIV1 P24 AG SERPL QL IA: NEGATIVE
INR BLD: 1.21 (ref 0.83–1.09)
KETONES UR QL STRIP: (no result)
LACTATE SERPL-MCNC: 2.2 MMOL/L (ref 0.4–2)
LACTATE SERPL-MCNC: 3.3 MMOL/L (ref 0.4–2)
LEUKOCYTE ESTERASE UR QL STRIP.AUTO: NEGATIVE
LIPASE SERPL-CCNC: 231 U/L (ref 73–393)
MACROCYTES BLD QL: (no result)
MAGNESIUM SERPL-MCNC: 2 MG/DL (ref 1.8–2.4)
MCH RBC QN AUTO: 32.3 PG (ref 25.7–33.7)
MCHC RBC AUTO-ENTMCNC: 34.7 G/DL (ref 32–35.9)
MCV RBC: 93.2 FL (ref 80–96)
METHADONE UR-MCNC: NEGATIVE UG/L
NITRITE UR QL STRIP: NEGATIVE
OPIATES UR QL SCN: NEGATIVE
PCO2 BLDV: 41.5 MMHG (ref 38–52)
PCP UR QL SCN: NEGATIVE
PH BLDV: 7.5 [PH] (ref 7.31–7.41)
PH UR: 7 [PH] (ref 5–8)
PLATELET # BLD AUTO: 138 10^3/UL (ref 134–434)
PMV BLD: 8.9 FL (ref 7.5–11.1)
PROT SERPL-MCNC: 5.9 G/DL (ref 6.4–8.2)
PROT UR QL STRIP: NEGATIVE
PROT UR QL STRIP: NEGATIVE
PT PNL PPP: 14 SEC (ref 9.7–13)
RBC # BLD AUTO: 4.02 M/MM3 (ref 4–5.6)
SAO2 % BLDV: 95.5 % (ref 70–80)
SODIUM SERPL-SCNC: 144 MMOL/L (ref 136–145)
SP GR UR: 1.02 (ref 1.01–1.03)
UROBILINOGEN UR STRIP-MCNC: 1 MG/DL (ref 0.2–1)
WBC # BLD AUTO: 5.6 K/MM3 (ref 4–10)

## 2023-01-12 PROCEDURE — G0480 DRUG TEST DEF 1-7 CLASSES: HCPCS

## 2023-01-12 PROCEDURE — HZ2ZZZZ DETOXIFICATION SERVICES FOR SUBSTANCE ABUSE TREATMENT: ICD-10-PCS | Performed by: INTERNAL MEDICINE

## 2023-01-12 RX ADMIN — POTASSIUM CHLORIDE SCH: 7.46 INJECTION, SOLUTION INTRAVENOUS at 20:34

## 2023-01-12 RX ADMIN — Medication SCH MG: at 23:57

## 2023-01-12 RX ADMIN — POTASSIUM CHLORIDE SCH MLS/HR: 7.46 INJECTION, SOLUTION INTRAVENOUS at 17:25

## 2023-01-12 RX ADMIN — POTASSIUM CHLORIDE SCH MLS/HR: 7.46 INJECTION, SOLUTION INTRAVENOUS at 18:58

## 2023-01-12 RX ADMIN — VITAMIN C SCH EACH: TAB at 19:51

## 2023-01-13 LAB
ALBUMIN SERPL-MCNC: 3.2 G/DL (ref 3.4–5)
ALP SERPL-CCNC: 94 U/L (ref 45–117)
ALT SERPL-CCNC: 51 U/L (ref 13–61)
ANION GAP SERPL CALC-SCNC: 10 MMOL/L (ref 8–16)
AST SERPL-CCNC: 76 U/L (ref 15–37)
BASOPHILS # BLD: 0.9 % (ref 0–2)
BILIRUB SERPL-MCNC: 1 MG/DL (ref 0.2–1)
BUN SERPL-MCNC: 5.7 MG/DL (ref 7–18)
CALCIUM SERPL-MCNC: 7.7 MG/DL (ref 8.5–10.1)
CHLORIDE SERPL-SCNC: 102 MMOL/L (ref 98–107)
CO2 SERPL-SCNC: 30 MMOL/L (ref 21–32)
CREAT SERPL-MCNC: 0.7 MG/DL (ref 0.55–1.3)
DEPRECATED RDW RBC AUTO: 15.5 % (ref 11.9–15.9)
EOSINOPHIL # BLD: 5.4 % (ref 0–4.5)
GLUCOSE SERPL-MCNC: 84 MG/DL (ref 74–106)
HCT VFR BLD CALC: 36.5 % (ref 35.4–49)
HGB BLD-MCNC: 12.8 GM/DL (ref 11.7–16.9)
LYMPHOCYTES # BLD: 29.8 % (ref 8–40)
MAGNESIUM SERPL-MCNC: 2 MG/DL (ref 1.8–2.4)
MCH RBC QN AUTO: 33 PG (ref 25.7–33.7)
MCHC RBC AUTO-ENTMCNC: 35.2 G/DL (ref 32–35.9)
MCV RBC: 93.8 FL (ref 80–96)
MONOCYTES # BLD AUTO: 10.9 % (ref 3.8–10.2)
NEUTROPHILS # BLD: 53 % (ref 42.8–82.8)
PHOSPHATE SERPL-MCNC: 2.9 MG/DL (ref 2.5–4.9)
PLATELET # BLD AUTO: 81 10^3/UL (ref 134–434)
PMV BLD: 9.2 FL (ref 7.5–11.1)
PROT SERPL-MCNC: 5.6 G/DL (ref 6.4–8.2)
RBC # BLD AUTO: 3.89 M/MM3 (ref 4–5.6)
SODIUM SERPL-SCNC: 142 MMOL/L (ref 136–145)
WBC # BLD AUTO: 2.7 K/MM3 (ref 4–10)

## 2023-01-13 RX ADMIN — PANTOPRAZOLE SODIUM SCH MG: 40 TABLET, DELAYED RELEASE ORAL at 10:16

## 2023-01-13 RX ADMIN — FOLIC ACID SCH MG: 1 TABLET ORAL at 10:16

## 2023-01-13 RX ADMIN — Medication SCH APPLIC: at 21:55

## 2023-01-13 RX ADMIN — VITAMIN C SCH: TAB at 10:17

## 2023-01-13 RX ADMIN — VANCOMYCIN SCH MLS/HR: 1 INJECTION, SOLUTION INTRAVENOUS at 16:55

## 2023-01-13 RX ADMIN — Medication SCH MG: at 21:56

## 2023-01-13 RX ADMIN — POTASSIUM CHLORIDE SCH: 7.46 INJECTION, SOLUTION INTRAVENOUS at 12:47

## 2023-01-13 RX ADMIN — Medication SCH APPLIC: at 10:15

## 2023-01-13 RX ADMIN — LEVOTHYROXINE SODIUM SCH MCG: 25 TABLET ORAL at 07:58

## 2023-01-13 RX ADMIN — TAMSULOSIN HYDROCHLORIDE SCH MG: 0.4 CAPSULE ORAL at 07:58

## 2023-01-13 RX ADMIN — ENOXAPARIN SODIUM SCH MG: 40 INJECTION SUBCUTANEOUS at 10:16

## 2023-01-13 RX ADMIN — MEROPENEM SCH MLS/HR: 1 INJECTION, POWDER, FOR SOLUTION INTRAVENOUS at 18:22

## 2023-01-13 RX ADMIN — Medication SCH: at 07:22

## 2023-01-13 RX ADMIN — Medication SCH MG: at 10:15

## 2023-01-14 LAB
ALBUMIN SERPL-MCNC: 3 G/DL (ref 3.4–5)
ALP SERPL-CCNC: 96 U/L (ref 45–117)
ALT SERPL-CCNC: 44 U/L (ref 13–61)
ANION GAP SERPL CALC-SCNC: 9 MMOL/L (ref 8–16)
AST SERPL-CCNC: 50 U/L (ref 15–37)
BASOPHILS # BLD: 1.1 % (ref 0–2)
BILIRUB SERPL-MCNC: 0.7 MG/DL (ref 0.2–1)
BUN SERPL-MCNC: 9.5 MG/DL (ref 7–18)
CALCIUM SERPL-MCNC: 8 MG/DL (ref 8.5–10.1)
CHLORIDE SERPL-SCNC: 105 MMOL/L (ref 98–107)
CO2 SERPL-SCNC: 28 MMOL/L (ref 21–32)
CREAT SERPL-MCNC: 0.8 MG/DL (ref 0.55–1.3)
DEPRECATED RDW RBC AUTO: 15.5 % (ref 11.9–15.9)
EOSINOPHIL # BLD: 5.6 % (ref 0–4.5)
GLUCOSE SERPL-MCNC: 100 MG/DL (ref 74–106)
HCT VFR BLD CALC: 35.9 % (ref 35.4–49)
HGB BLD-MCNC: 12.6 GM/DL (ref 11.7–16.9)
LYMPHOCYTES # BLD: 32.1 % (ref 8–40)
MCH RBC QN AUTO: 32.8 PG (ref 25.7–33.7)
MCHC RBC AUTO-ENTMCNC: 35.3 G/DL (ref 32–35.9)
MCV RBC: 93.1 FL (ref 80–96)
MONOCYTES # BLD AUTO: 11.7 % (ref 3.8–10.2)
NEUTROPHILS # BLD: 49.5 % (ref 42.8–82.8)
PLATELET # BLD AUTO: 75 10^3/UL (ref 134–434)
PMV BLD: 9.4 FL (ref 7.5–11.1)
PROT SERPL-MCNC: 5.5 G/DL (ref 6.4–8.2)
RBC # BLD AUTO: 3.85 M/MM3 (ref 4–5.6)
SODIUM SERPL-SCNC: 142 MMOL/L (ref 136–145)
WBC # BLD AUTO: 2 K/MM3 (ref 4–10)

## 2023-01-14 RX ADMIN — POTASSIUM CHLORIDE SCH MEQ: 1500 TABLET, EXTENDED RELEASE ORAL at 09:55

## 2023-01-14 RX ADMIN — TAMSULOSIN HYDROCHLORIDE SCH MG: 0.4 CAPSULE ORAL at 08:58

## 2023-01-14 RX ADMIN — ENOXAPARIN SODIUM SCH MG: 40 INJECTION SUBCUTANEOUS at 09:58

## 2023-01-14 RX ADMIN — MEROPENEM SCH MLS/HR: 1 INJECTION, POWDER, FOR SOLUTION INTRAVENOUS at 09:56

## 2023-01-14 RX ADMIN — CARBAMAZEPINE SCH MG: 200 TABLET, EXTENDED RELEASE ORAL at 21:47

## 2023-01-14 RX ADMIN — PANTOPRAZOLE SODIUM SCH MG: 40 TABLET, DELAYED RELEASE ORAL at 09:58

## 2023-01-14 RX ADMIN — FOLIC ACID SCH MG: 1 TABLET ORAL at 09:57

## 2023-01-14 RX ADMIN — MEROPENEM SCH MLS/HR: 1 INJECTION, POWDER, FOR SOLUTION INTRAVENOUS at 01:20

## 2023-01-14 RX ADMIN — Medication SCH APPLIC: at 23:00

## 2023-01-14 RX ADMIN — MEROPENEM SCH MLS/HR: 1 INJECTION, POWDER, FOR SOLUTION INTRAVENOUS at 17:30

## 2023-01-14 RX ADMIN — Medication SCH MG: at 09:58

## 2023-01-14 RX ADMIN — Medication SCH APPLIC: at 10:15

## 2023-01-14 RX ADMIN — VANCOMYCIN SCH MLS/HR: 1 INJECTION, SOLUTION INTRAVENOUS at 17:31

## 2023-01-14 RX ADMIN — LEVOTHYROXINE SODIUM SCH MCG: 25 TABLET ORAL at 06:17

## 2023-01-14 RX ADMIN — VITAMIN C SCH EACH: TAB at 10:30

## 2023-01-14 RX ADMIN — Medication SCH MG: at 21:47

## 2023-01-15 RX ADMIN — NYSTATIN SCH APPLIC: 100000 OINTMENT TOPICAL at 22:27

## 2023-01-15 RX ADMIN — POTASSIUM CHLORIDE SCH MEQ: 1500 TABLET, EXTENDED RELEASE ORAL at 10:48

## 2023-01-15 RX ADMIN — MEROPENEM SCH MLS/HR: 1 INJECTION, POWDER, FOR SOLUTION INTRAVENOUS at 01:54

## 2023-01-15 RX ADMIN — TAMSULOSIN HYDROCHLORIDE SCH MG: 0.4 CAPSULE ORAL at 10:51

## 2023-01-15 RX ADMIN — MEROPENEM SCH MLS/HR: 1 INJECTION, POWDER, FOR SOLUTION INTRAVENOUS at 17:23

## 2023-01-15 RX ADMIN — VITAMIN C SCH EACH: TAB at 10:49

## 2023-01-15 RX ADMIN — VANCOMYCIN SCH MLS/HR: 1 INJECTION, SOLUTION INTRAVENOUS at 17:23

## 2023-01-15 RX ADMIN — NYSTATIN SCH APPLIC: 100000 OINTMENT TOPICAL at 13:11

## 2023-01-15 RX ADMIN — LEVOTHYROXINE SODIUM SCH MCG: 25 TABLET ORAL at 06:11

## 2023-01-15 RX ADMIN — Medication SCH MG: at 10:48

## 2023-01-15 RX ADMIN — CARBAMAZEPINE SCH MG: 200 TABLET, EXTENDED RELEASE ORAL at 22:26

## 2023-01-15 RX ADMIN — Medication SCH APPLIC: at 10:51

## 2023-01-15 RX ADMIN — FOLIC ACID SCH MG: 1 TABLET ORAL at 10:48

## 2023-01-15 RX ADMIN — ENOXAPARIN SODIUM SCH: 40 INJECTION SUBCUTANEOUS at 10:50

## 2023-01-15 RX ADMIN — MEROPENEM SCH MLS/HR: 1 INJECTION, POWDER, FOR SOLUTION INTRAVENOUS at 10:49

## 2023-01-15 RX ADMIN — PANTOPRAZOLE SODIUM SCH MG: 40 TABLET, DELAYED RELEASE ORAL at 10:48

## 2023-01-15 RX ADMIN — Medication SCH MG: at 22:26

## 2023-01-16 LAB
ALBUMIN SERPL-MCNC: 3 G/DL (ref 3.4–5)
ALP SERPL-CCNC: 96 U/L (ref 45–117)
ALT SERPL-CCNC: 38 U/L (ref 13–61)
ANION GAP SERPL CALC-SCNC: 4 MMOL/L (ref 8–16)
AST SERPL-CCNC: 35 U/L (ref 15–37)
BASOPHILS # BLD: 1.3 % (ref 0–2)
BILIRUB SERPL-MCNC: 0.9 MG/DL (ref 0.2–1)
BUN SERPL-MCNC: 13.2 MG/DL (ref 7–18)
CALCIUM SERPL-MCNC: 8.1 MG/DL (ref 8.5–10.1)
CHLORIDE SERPL-SCNC: 107 MMOL/L (ref 98–107)
CO2 SERPL-SCNC: 30 MMOL/L (ref 21–32)
CREAT SERPL-MCNC: 0.6 MG/DL (ref 0.55–1.3)
DEPRECATED RDW RBC AUTO: 15.9 % (ref 11.9–15.9)
EOSINOPHIL # BLD: 6.8 % (ref 0–4.5)
GLUCOSE SERPL-MCNC: 89 MG/DL (ref 74–106)
HCT VFR BLD CALC: 36.2 % (ref 35.4–49)
HGB BLD-MCNC: 12.7 GM/DL (ref 11.7–16.9)
LYMPHOCYTES # BLD: 24.3 % (ref 8–40)
MCH RBC QN AUTO: 32.6 PG (ref 25.7–33.7)
MCHC RBC AUTO-ENTMCNC: 35 G/DL (ref 32–35.9)
MCV RBC: 93.2 FL (ref 80–96)
MONOCYTES # BLD AUTO: 14.5 % (ref 3.8–10.2)
NEUTROPHILS # BLD: 53.1 % (ref 42.8–82.8)
PLATELET # BLD AUTO: 81 10^3/UL (ref 134–434)
PMV BLD: 8.7 FL (ref 7.5–11.1)
PROT SERPL-MCNC: 5.5 G/DL (ref 6.4–8.2)
RBC # BLD AUTO: 3.89 M/MM3 (ref 4–5.6)
SODIUM SERPL-SCNC: 141 MMOL/L (ref 136–145)
WBC # BLD AUTO: 2.8 K/MM3 (ref 4–10)

## 2023-01-16 RX ADMIN — PANTOPRAZOLE SODIUM SCH MG: 40 TABLET, DELAYED RELEASE ORAL at 11:14

## 2023-01-16 RX ADMIN — NYSTATIN SCH APPLIC: 100000 OINTMENT TOPICAL at 11:15

## 2023-01-16 RX ADMIN — Medication SCH MG: at 22:08

## 2023-01-16 RX ADMIN — MEROPENEM SCH MLS/HR: 1 INJECTION, POWDER, FOR SOLUTION INTRAVENOUS at 17:39

## 2023-01-16 RX ADMIN — VANCOMYCIN SCH MLS/HR: 1 INJECTION, SOLUTION INTRAVENOUS at 17:36

## 2023-01-16 RX ADMIN — POTASSIUM CHLORIDE SCH MEQ: 1500 TABLET, EXTENDED RELEASE ORAL at 11:14

## 2023-01-16 RX ADMIN — TAMSULOSIN HYDROCHLORIDE SCH MG: 0.4 CAPSULE ORAL at 09:05

## 2023-01-16 RX ADMIN — CARBAMAZEPINE SCH MG: 200 TABLET, EXTENDED RELEASE ORAL at 22:08

## 2023-01-16 RX ADMIN — FOLIC ACID SCH MG: 1 TABLET ORAL at 11:14

## 2023-01-16 RX ADMIN — LEVOTHYROXINE SODIUM SCH MCG: 25 TABLET ORAL at 07:05

## 2023-01-16 RX ADMIN — VITAMIN C SCH EACH: TAB at 11:14

## 2023-01-16 RX ADMIN — ENOXAPARIN SODIUM SCH MG: 40 INJECTION SUBCUTANEOUS at 11:15

## 2023-01-16 RX ADMIN — Medication SCH MG: at 11:14

## 2023-01-16 RX ADMIN — MEROPENEM SCH MLS/HR: 1 INJECTION, POWDER, FOR SOLUTION INTRAVENOUS at 11:06

## 2023-01-16 RX ADMIN — MEROPENEM SCH MLS/HR: 1 INJECTION, POWDER, FOR SOLUTION INTRAVENOUS at 03:28

## 2023-01-16 RX ADMIN — NYSTATIN SCH APPLIC: 100000 OINTMENT TOPICAL at 22:08

## 2023-01-17 LAB
ALBUMIN SERPL-MCNC: 2.8 G/DL (ref 3.4–5)
ALP SERPL-CCNC: 99 U/L (ref 45–117)
ALT SERPL-CCNC: 35 U/L (ref 13–61)
ANION GAP SERPL CALC-SCNC: 3 MMOL/L (ref 8–16)
AST SERPL-CCNC: 33 U/L (ref 15–37)
BASOPHILS # BLD: 1.1 % (ref 0–2)
BILIRUB SERPL-MCNC: 0.6 MG/DL (ref 0.2–1)
BUN SERPL-MCNC: 12.8 MG/DL (ref 7–18)
CALCIUM SERPL-MCNC: 8.2 MG/DL (ref 8.5–10.1)
CHLORIDE SERPL-SCNC: 109 MMOL/L (ref 98–107)
CO2 SERPL-SCNC: 28 MMOL/L (ref 21–32)
CREAT SERPL-MCNC: 0.7 MG/DL (ref 0.55–1.3)
DEPRECATED RDW RBC AUTO: 15.4 % (ref 11.9–15.9)
EOSINOPHIL # BLD: 5.5 % (ref 0–4.5)
GLUCOSE SERPL-MCNC: 101 MG/DL (ref 74–106)
HCT VFR BLD CALC: 35.3 % (ref 35.4–49)
HGB BLD-MCNC: 12.2 GM/DL (ref 11.7–16.9)
LYMPHOCYTES # BLD: 21.9 % (ref 8–40)
MCH RBC QN AUTO: 32.7 PG (ref 25.7–33.7)
MCHC RBC AUTO-ENTMCNC: 34.6 G/DL (ref 32–35.9)
MCV RBC: 94.5 FL (ref 80–96)
MONOCYTES # BLD AUTO: 15.5 % (ref 3.8–10.2)
NEUTROPHILS # BLD: 56 % (ref 42.8–82.8)
PLATELET # BLD AUTO: 77 10^3/UL (ref 134–434)
PMV BLD: 9.9 FL (ref 7.5–11.1)
PROT SERPL-MCNC: 5.3 G/DL (ref 6.4–8.2)
RBC # BLD AUTO: 3.73 M/MM3 (ref 4–5.6)
SODIUM SERPL-SCNC: 140 MMOL/L (ref 136–145)
WBC # BLD AUTO: 2.7 K/MM3 (ref 4–10)

## 2023-01-17 RX ADMIN — FOLIC ACID SCH MG: 1 TABLET ORAL at 10:21

## 2023-01-17 RX ADMIN — LEVOTHYROXINE SODIUM SCH MCG: 25 TABLET ORAL at 06:03

## 2023-01-17 RX ADMIN — CARBAMAZEPINE SCH MG: 200 TABLET, EXTENDED RELEASE ORAL at 21:12

## 2023-01-17 RX ADMIN — NYSTATIN SCH APPLIC: 100000 OINTMENT TOPICAL at 10:23

## 2023-01-17 RX ADMIN — PANTOPRAZOLE SODIUM SCH MG: 40 TABLET, DELAYED RELEASE ORAL at 10:22

## 2023-01-17 RX ADMIN — MEROPENEM SCH MLS/HR: 1 INJECTION, POWDER, FOR SOLUTION INTRAVENOUS at 17:27

## 2023-01-17 RX ADMIN — ENOXAPARIN SODIUM SCH MG: 40 INJECTION SUBCUTANEOUS at 10:23

## 2023-01-17 RX ADMIN — MEROPENEM SCH MLS/HR: 1 INJECTION, POWDER, FOR SOLUTION INTRAVENOUS at 10:23

## 2023-01-17 RX ADMIN — Medication SCH PACKET: at 21:11

## 2023-01-17 RX ADMIN — NYSTATIN SCH APPLIC: 100000 OINTMENT TOPICAL at 21:12

## 2023-01-17 RX ADMIN — VITAMIN C SCH EACH: TAB at 10:23

## 2023-01-17 RX ADMIN — VANCOMYCIN SCH MLS/HR: 1 INJECTION, SOLUTION INTRAVENOUS at 17:27

## 2023-01-17 RX ADMIN — TAMSULOSIN HYDROCHLORIDE SCH MG: 0.4 CAPSULE ORAL at 08:20

## 2023-01-17 RX ADMIN — POTASSIUM CHLORIDE SCH MEQ: 1500 TABLET, EXTENDED RELEASE ORAL at 10:22

## 2023-01-17 RX ADMIN — Medication SCH MG: at 21:12

## 2023-01-17 RX ADMIN — MEROPENEM SCH MLS/HR: 1 INJECTION, POWDER, FOR SOLUTION INTRAVENOUS at 01:46

## 2023-01-17 RX ADMIN — Medication SCH MG: at 10:22

## 2023-01-18 VITALS — DIASTOLIC BLOOD PRESSURE: 77 MMHG | HEART RATE: 84 BPM | TEMPERATURE: 97.8 F | SYSTOLIC BLOOD PRESSURE: 144 MMHG

## 2023-01-18 VITALS — RESPIRATION RATE: 16 BRPM

## 2023-01-18 RX ADMIN — PANTOPRAZOLE SODIUM SCH MG: 40 TABLET, DELAYED RELEASE ORAL at 10:22

## 2023-01-18 RX ADMIN — POTASSIUM CHLORIDE SCH MEQ: 1500 TABLET, EXTENDED RELEASE ORAL at 10:22

## 2023-01-18 RX ADMIN — MEROPENEM SCH: 1 INJECTION, POWDER, FOR SOLUTION INTRAVENOUS at 13:42

## 2023-01-18 RX ADMIN — MEROPENEM SCH MLS/HR: 1 INJECTION, POWDER, FOR SOLUTION INTRAVENOUS at 01:42

## 2023-01-18 RX ADMIN — VITAMIN C SCH EACH: TAB at 10:22

## 2023-01-18 RX ADMIN — LEVOTHYROXINE SODIUM SCH MCG: 25 TABLET ORAL at 06:01

## 2023-01-18 RX ADMIN — TAMSULOSIN HYDROCHLORIDE SCH MG: 0.4 CAPSULE ORAL at 10:22

## 2023-01-18 RX ADMIN — FOLIC ACID SCH MG: 1 TABLET ORAL at 10:22

## 2023-01-18 RX ADMIN — ENOXAPARIN SODIUM SCH MG: 40 INJECTION SUBCUTANEOUS at 10:22

## 2023-01-18 RX ADMIN — Medication SCH MG: at 10:22

## 2023-01-18 RX ADMIN — NYSTATIN SCH: 100000 OINTMENT TOPICAL at 13:42

## 2023-01-18 RX ADMIN — Medication SCH PACKET: at 05:17
